# Patient Record
Sex: FEMALE | Race: WHITE | URBAN - METROPOLITAN AREA
[De-identification: names, ages, dates, MRNs, and addresses within clinical notes are randomized per-mention and may not be internally consistent; named-entity substitution may affect disease eponyms.]

---

## 2017-07-24 DIAGNOSIS — Z11.1 TUBERCULOSIS SCREENING: ICD-10-CM

## 2017-07-24 DIAGNOSIS — Z23 VACCINE FOR VIRAL HEPATITIS: ICD-10-CM

## 2017-07-24 DIAGNOSIS — Z23 NEED FOR TDAP VACCINATION: Primary | ICD-10-CM

## 2017-07-24 DIAGNOSIS — Z28.89 VARICELLA VACCINATION NOT INDICATED: ICD-10-CM

## 2017-07-24 PROCEDURE — 90472 IMMUNIZATION ADMIN EACH ADD: CPT

## 2017-07-24 PROCEDURE — 90715 TDAP VACCINE 7 YRS/> IM: CPT

## 2017-07-24 PROCEDURE — 90471 IMMUNIZATION ADMIN: CPT

## 2017-07-24 PROCEDURE — 86480 TB TEST CELL IMMUN MEASURE: CPT | Performed by: OBSTETRICS & GYNECOLOGY

## 2017-07-24 PROCEDURE — 86787 VARICELLA-ZOSTER ANTIBODY: CPT | Performed by: OBSTETRICS & GYNECOLOGY

## 2017-07-24 PROCEDURE — 36415 COLL VENOUS BLD VENIPUNCTURE: CPT | Performed by: OBSTETRICS & GYNECOLOGY

## 2017-07-24 PROCEDURE — 90636 HEP A/HEP B VACC ADULT IM: CPT

## 2017-07-24 NOTE — MR AVS SNAPSHOT
"              After Visit Summary   7/24/2017    Buffy Mayer    MRN: [id number]           Patient Information     Date Of Birth          1985        Visit Information        Provider Department      7/24/2017 10:00 AM WE LAB [facility]        Today's Diagnoses     Need for Tdap vaccination    -  1    Vaccine for viral hepatitis        Tuberculosis screening        Varicella vaccination not indicated           Follow-ups after your visit        Your next 10 appointments already scheduled     Aug 08, 2017  9:00 AM CDT   PHYSICAL with REJI Aburto CNP   [facility]    [address] [id number] [phone]              Who to contact     If you have questions or need follow up information about today's clinic visit or your schedule please contact [facility] directly at [phone].  Normal or non-critical lab and imaging results will be communicated to you by MyChart, letter or phone within 4 business days after the clinic has received the results. If you do not hear from us within 7 days, please contact the clinic through MyChart or phone. If you have a critical or abnormal lab result, we will notify you by phone as soon as possible.  Submit refill requests through [employer] or call your pharmacy and they will forward the refill request to us. Please allow 3 business days for your refill to be completed.          Additional Information About Your Visit        MyChart Information     [employer] lets you send messages to your doctor, view your test results, renew your prescriptions, schedule appointments and more. To sign up, go to [email or web address] . Click on \"Log in\" on the left side of the screen, which will take you to the Welcome page. Then click on \"Sign up Now\" on the right side of the page.     You will be asked to enter the access code listed below, as well as some " personal information. Please follow the directions to create your username and password.     Your access code is: [email or web address]  Expires: 10/22/2017 10:24 AM     Your access code will  in 90 days. If you need help or a new code, please call your [place] clinic or [phone].        Care EveryWhere ID     This is your Care EveryWhere ID. This could be used by other organizations to access your [place] medical records  [id number]         Blood Pressure from Last 3 Encounters:   07/08/15 102/64   12 114/72    Weight from Last 3 Encounters:   16 136 lb (61.7 kg)   07/08/15 139 lb (63 kg)   12 137 lb (62.1 kg)              We Performed the Following     HEPA/HEPB VACCINE ADULT IM     M Tuberculosis by Quantiferon     TDAP VACCINE (ADACEL)     Varicella Zoster Virus Antibody IgG        Primary Care Provider Office Phone # Fax #    Tami Scott PA-C [phone]       [facility] [address] [facility] [address]  [facility] [address]        Equal Access to Services     [facility]: Hadii aad ku hadasho Soomaali, waaxda luqadaha, qaybta kaalmada adeegyada, mateo bahena hayvicky eisenberg . So [facility] [phone].    ATENCIÓN: Si habla español, tiene a sotelo disposición servicios gratuitos de asistencia lingüística. Zoieame al [phone].    We comply with applicable federal civil rights laws and [place] laws. We do not discriminate on the basis of race, color, national origin, age, disability sex, sexual orientation or gender identity.            Thank you!     Thank you for choosing [facility] [place]  for your care. Our goal is always to provide you with excellent care. Hearing back from our patients is one way we can continue to improve our services. Please take a few minutes to complete the written survey that you may receive in the mail after your visit with us. Thank you!             Your Updated Medication List - Protect others around you: Learn how to safely  use, store and throw away your medicines at www.disposemymeds.org.          This list is accurate as of: 7/24/17 10:24 AM.  Always use your most recent med list.                   Brand Name Dispense Instructions for use Diagnosis    etonogestrel-ethinyl estradiol 0.12-0.015 MG/24HR vaginal ring    NUVARING    3 each    Insert 1 ring vaginally every 21 days then remove for 1 week then repeat with new ring.    Encounter for surveillance of other contraceptive       KARIVA 0.15-0.02/0.01 MG (21/5) per tablet   Generic drug:  desogestrel-ethinyl estradiol     28 tablet    TAKE ONE TABLET BY MOUTH ONE TIME DAILY    Uses oral contraception       penicillin V potassium 500 MG tablet    VEETID    20 tablet    Take 1 tablet by mouth 2 times daily.    Streptococcal pharyngitis

## 2017-07-25 LAB — VZV IGG SER QL IA: 4.9 AI (ref 0–0.8)

## 2017-07-26 LAB
M TB TUBERC IFN-G BLD QL: NEGATIVE
M TB TUBERC IFN-G/MITOGEN IGNF BLD: 0 IU/ML

## 2017-07-27 NOTE — NURSING NOTE
Screening Questionnaire for Adult Immunization    Are you sick today?   No   Do you have allergies to medications, food, a vaccine component or latex?   No   Have you ever had a serious reaction after receiving a vaccination?   No   Do you have a long-term health problem with heart disease, lung disease, asthma, kidney disease, metabolic disease (e.g. diabetes), anemia, or other blood disorder?   No   Do you have cancer, leukemia, HIV/AIDS, or any other immune system problem?   No   In the past 3 months, have you taken medications that affect  your immune system, such as prednisone, other steroids, or anticancer drugs; drugs for the treatment of rheumatoid arthritis, Crohn s disease, or psoriasis; or have you had radiation treatments?   No   Have you had a seizure, or a brain or other nervous system problem?   No   During the past year, have you received a transfusion of blood or blood     products, or been given immune (gamma) globulin or antiviral drug?   No   For women: Are you pregnant or is there a chance you could become        pregnant during the next month?   No   Have you received any vaccinations in the past 4 weeks?   No     Immunization questionnaire answers were all negative.      MNVFC doesn't apply on this patient    Per orders of Dr. Hernandez, injection of Tdap,TwinRix given by Kimberly Francois. Patient instructed to remain in clinic for 15 minutes afterwards, and to report any adverse reaction to me immediately.       Screening performed by Kimberly Francois on 7/24/2017 at 10:05 AM.

## 2017-08-08 ENCOUNTER — OFFICE VISIT (OUTPATIENT)
Dept: OBGYN | Facility: CLINIC | Age: 32
End: 2017-08-08
Payer: COMMERCIAL

## 2017-08-08 VITALS
DIASTOLIC BLOOD PRESSURE: 54 MMHG | WEIGHT: 156 LBS | HEIGHT: 65 IN | BODY MASS INDEX: 25.99 KG/M2 | SYSTOLIC BLOOD PRESSURE: 96 MMHG

## 2017-08-08 DIAGNOSIS — Z30.49 ENCOUNTER FOR SURVEILLANCE OF OTHER CONTRACEPTIVE: ICD-10-CM

## 2017-08-08 DIAGNOSIS — D25.9 UTERINE LEIOMYOMA, UNSPECIFIED LOCATION: ICD-10-CM

## 2017-08-08 DIAGNOSIS — Z01.419 ENCOUNTER FOR GYNECOLOGICAL EXAMINATION WITHOUT ABNORMAL FINDING: Primary | ICD-10-CM

## 2017-08-08 PROCEDURE — 99395 PREV VISIT EST AGE 18-39: CPT | Performed by: NURSE PRACTITIONER

## 2017-08-08 PROCEDURE — 87624 HPV HI-RISK TYP POOLED RSLT: CPT | Performed by: NURSE PRACTITIONER

## 2017-08-08 PROCEDURE — G0145 SCR C/V CYTO,THINLAYER,RESCR: HCPCS | Performed by: NURSE PRACTITIONER

## 2017-08-08 RX ORDER — ETONOGESTREL AND ETHINYL ESTRADIOL VAGINAL RING .015; .12 MG/D; MG/D
RING VAGINAL
Qty: 3 EACH | Refills: 3 | Status: SHIPPED | OUTPATIENT
Start: 2017-08-08 | End: 2017-08-24

## 2017-08-08 ASSESSMENT — ANXIETY QUESTIONNAIRES
GAD7 TOTAL SCORE: 1
7. FEELING AFRAID AS IF SOMETHING AWFUL MIGHT HAPPEN: NOT AT ALL
1. FEELING NERVOUS, ANXIOUS, OR ON EDGE: NOT AT ALL
5. BEING SO RESTLESS THAT IT IS HARD TO SIT STILL: NOT AT ALL
6. BECOMING EASILY ANNOYED OR IRRITABLE: NOT AT ALL
2. NOT BEING ABLE TO STOP OR CONTROL WORRYING: NOT AT ALL
3. WORRYING TOO MUCH ABOUT DIFFERENT THINGS: NOT AT ALL
IF YOU CHECKED OFF ANY PROBLEMS ON THIS QUESTIONNAIRE, HOW DIFFICULT HAVE THESE PROBLEMS MADE IT FOR YOU TO DO YOUR WORK, TAKE CARE OF THINGS AT HOME, OR GET ALONG WITH OTHER PEOPLE: NOT DIFFICULT AT ALL

## 2017-08-08 ASSESSMENT — PATIENT HEALTH QUESTIONNAIRE - PHQ9
5. POOR APPETITE OR OVEREATING: SEVERAL DAYS
SUM OF ALL RESPONSES TO PHQ QUESTIONS 1-9: 3

## 2017-08-08 NOTE — LETTER
August 18, 2017    Buffy Mayer  [address] [place] [address]    Dear Buffy,  We are happy to inform you that your PAP smear result from 8/8/17 is normal.  We are now able to do a follow up test on PAP smears. The DNA test is for HPV (Human Papilloma Virus). Cervical cancer is closely linked with certain types of HPV. Your result showed no evidence of high risk HPV.  Therefore we recommend you return in 1 year for your next pap smear.  You will still need to return to the clinic every year for an annual exam and other preventive tests.  Please contact the clinic at [phone] with any questions.  Sincerely,    REJI Aburto CNP/rlm

## 2017-08-08 NOTE — MR AVS SNAPSHOT
"              After Visit Summary   8/8/2017    Buffy Mayer    MRN: [id number]           Patient Information     Date Of Birth          1985        Visit Information        Provider Department      8/8/2017 9:00 AM Doris Mensah APRN CNP [facility]        Today's Diagnoses     Encounter for gynecological examination without abnormal finding    -  1    Encounter for surveillance of other contraceptive        Uterine leiomyoma, unspecified location           Follow-ups after your visit        Follow-up notes from your care team     Return in about 1 year (around 8/8/2018).      Future tests that were ordered for you today     Open Future Orders        Priority Expected Expires Ordered    US Transvaginal Non OB Routine  8/8/2018 8/8/2017            Who to contact     If you have questions or need follow up information about today's clinic visit or your schedule please contact [facility] directly at [phone].  Normal or non-critical lab and imaging results will be communicated to you by MyChart, letter or phone within 4 business days after the clinic has received the results. If you do not hear from us within 7 days, please contact the clinic through MyChart or phone. If you have a critical or abnormal lab result, we will notify you by phone as soon as possible.  Submit refill requests through [employer] or call your pharmacy and they will forward the refill request to us. Please allow 3 business days for your refill to be completed.          Additional Information About Your Visit        MyChart Information     [employer] lets you send messages to your doctor, view your test results, renew your prescriptions, schedule appointments and more. To sign up, go to [email or web address] . Click on \"Log in\" on the left side of the screen, which will take you to the Welcome page. Then click on \"Sign up Now\" on the right side of the page.     You will be asked to enter the " "access code listed below, as well as some personal information. Please follow the directions to create your username and password.     Your access code is: [email or web address]  Expires: 10/22/2017 10:24 AM     Your access code will  in 90 days. If you need help or a new code, please call your [place] clinic or [phone].        Care EveryWhere ID     This is your Care EveryWhere ID. This could be used by other organizations to access your [place] medical records  [id number]        Your Vitals Were     Height Last Period Breastfeeding? BMI (Body Mass Index)          5' 5.25\" (1.657 m) 2017 (Exact Date) No 25.76 kg/m2         Blood Pressure from Last 3 Encounters:   17 96/54   07/08/15 102/64   12 114/72    Weight from Last 3 Encounters:   17 156 lb (70.8 kg)   16 136 lb (61.7 kg)   07/08/15 139 lb (63 kg)              We Performed the Following     HPV High Risk Types DNA Cervical     Pap imaged thin layer screen with HPV - recommended age 30 - 65          Where to get your medicines      These medications were sent to [facility] [address] [facility] [place] - [address]  [address] [place] [address]     Phone:  [phone]     etonogestrel-ethinyl estradiol 0.12-0.015 MG/24HR vaginal ring          Primary Care Provider    None Specified       No primary provider on file.        Equal Access to Services     [facility]: Hadii lew garciao Jose Juan, waaxda lucrystaladaha, qaybta kaalmateo mckinney . So [facility] [phone].    ATENCIÓN: Si habla español, tiene a sotelo disposición servicios gratuitos de asistencia lingüística. Llame al [phone].    We comply with applicable federal civil rights laws and [place] laws. We do not discriminate on the basis of race, color, national origin, age, disability sex, sexual orientation or gender identity.            Thank you!     Thank you for choosing [facility] [place]  for your care. Our " goal is always to provide you with excellent care. Hearing back from our patients is one way we can continue to improve our services. Please take a few minutes to complete the written survey that you may receive in the mail after your visit with us. Thank you!             Your Updated Medication List - Protect others around you: Learn how to safely use, store and throw away your medicines at www.disposemymeds.org.          This list is accurate as of: 8/8/17  9:43 AM.  Always use your most recent med list.                   Brand Name Dispense Instructions for use Diagnosis    etonogestrel-ethinyl estradiol 0.12-0.015 MG/24HR vaginal ring    NUVARING    3 each    Insert 1 ring vaginally every 21 days then remove for 1 week then repeat with new ring.    Encounter for surveillance of other contraceptive

## 2017-08-08 NOTE — PROGRESS NOTES
Buffy is a 32 year old  female who presents for annual exam.     Besides routine health maintenance, she has no other health concerns today .    HPI:  The patient's PCP is None. Pt here today for her annual exam. She is feeling over all well. She has problems keeping her Nuva Ring in. Towards the end of the 2nd week she notices her ring 'slipping out'.    She is in school for sonography and her professor identified 3 uterine fibroids this fall.    She does want children in the future, she and her  are thinking early next year or so.      GYNECOLOGIC HISTORY:    Patient's last menstrual period was 2017 (exact date).  Her current contraception method is: Nuva Ring.  She  reports that she has never smoked. She has never used smokeless tobacco.      Patient is sexually active.  STD testing offered?  Declined  Last PHQ-9 score on record =   PHQ-9 SCORE 2017   Total Score 3     Last GAD7 score on record =   BELINDA-7 SCORE 2017   Total Score 1     Alcohol Score = 2    HEALTH MAINTENANCE:  Cholesterol: Never  Last Mammo: Never, Result: not applicable, Next Mammo: Age 40  Pap: Unsure  Colonoscopy:  Never, Result: not applicable, Next Colonoscopy: Age 50  Dexa: Never    Health maintenance updated:  yes    HISTORY:  Obstetric History       T0      L0     SAB0   TAB0   Ectopic0   Multiple0   Live Births0           There is no problem list on file for this patient.    Past Surgical History:   Procedure Laterality Date     COLONOSCOPY        Social History   Substance Use Topics     Smoking status: Never Smoker     Smokeless tobacco: Never Used     Alcohol use 0.0 oz/week     0 Standard drinks or equivalent per week      Problem (# of Occurrences) Relation (Name,Age of Onset)    Breast Cancer (1) Maternal Grandmother    Hyperlipidemia (2) Mother, Father    Hypertension (2) Mother, Father            Current Outpatient Prescriptions   Medication Sig     etonogestrel-ethinyl estradiol  "(NUVARING) 0.12-0.015 MG/24HR vaginal ring Insert 1 ring vaginally every 21 days then remove for 1 week then repeat with new ring.     [DISCONTINUED] etonogestrel-ethinyl estradiol (NUVARING) 0.12-0.015 MG/24HR vaginal ring Insert 1 ring vaginally every 21 days then remove for 1 week then repeat with new ring.     No current facility-administered medications for this visit.      No Known Allergies    Past medical, surgical, social and family histories were reviewed and updated in EPIC.    ROS:   12 point review of systems negative other than symptoms noted below.  Constitutional: Weight Gain  Head: Sore Throat  Genitourinary: Vaginal Itching  Musculoskeletal: Joint Pain    EXAM:  BP 96/54  Ht 5' 5.25\" (1.657 m)  Wt 156 lb (70.8 kg)  LMP 08/01/2017 (Exact Date)  Breastfeeding? No  BMI 25.76 kg/m2   BMI: Body mass index is 25.76 kg/(m^2).    PHYSICAL EXAM:  Constitutional:  Appearance: Well nourished, well developed, alert, in no acute distress  Neck:  Lymph Nodes:  No lymphadenopathy present    Thyroid:  Gland size normal, nontender, no nodules or masses present  on palpation  Chest:  Respiratory Effort:  Breathing unlabored  Cardiovascular:    Heart: Auscultation:  Regular rate, normal rhythm, no murmurs present  Breasts: Inspection of Breasts:  No lymphadenopathy present    Palpation of Breasts and Axillae:  No masses present on palpation, no  breast tenderness    Axillary Lymph Nodes:  No lymphadenopathy present  Gastrointestinal:   Abdominal Examination:  Abdomen nontender to palpation, tone normal without rigidity or guarding, no masses present, umbilicus without lesions   Liver and Spleen:  No hepatomegaly present, liver nontender to palpation    Hernias:  No hernias present  Lymphatic: Lymph Nodes:  No other lymphadenopathy present  Skin:  General Inspection:  No rashes present, no lesions present, no areas of  discoloration    Genitalia and Groin:  No rashes present, no lesions present, no areas of "  discoloration, no masses present  Neurologic/Psychiatric:    Mental Status:  Oriented X3     Pelvic Exam:  External Genitalia:     Normal appearance for age, no discharge present, no tenderness present, no inflammatory lesions present, color normal  Vagina:     Normal vaginal vault without central or paravaginal defects, no discharge present, no inflammatory lesions present, no masses present, NUVA RING PRESENT-REPOSITIONED FOR PROPER PLACEMENT  Bladder:     Nontender to palpation  Urethra:   Urethral Body:  Urethra palpation normal, urethra structural support normal   Urethral Meatus:  No erythema or lesions present  Cervix:     Appearance healthy, no lesions present, nontender to palpation, no bleeding present  Uterus:     Uterus: firm, normal sized and nontender, anteverted in position. ONE FIBROID NOTED cervically  Adnexa:     No adnexal tenderness present, no adnexal masses present  Perineum:     Perineum within normal limits, no evidence of trauma, no rashes or skin lesions present  Anus:     Anus within normal limits, no hemorrhoids present  Inguinal Lymph Nodes:     No lymphadenopathy present  Pubic Hair:     Normal pubic hair distribution for age  Genitalia and Groin:     No rashes present, no lesions present, no areas of discoloration, no masses present      COUNSELING:   Special attention given to:        Regular exercise       Healthy diet/nutrition       Contraception       Family planning    BMI: Body mass index is 25.76 kg/(m^2).  Weight management plan: Discussed healthy diet and exercise guidelines and patient will follow up in 12 months in clinic to re-evaluate.    ASSESSMENT:  32 year old female with satisfactory annual exam.    ICD-10-CM    1. Encounter for gynecological examination without abnormal finding Z01.419 Pap imaged thin layer screen with HPV - recommended age 30 - 65     HPV High Risk Types DNA Cervical   2. Encounter for surveillance of other contraceptive Z30.49 etonogestrel-ethinyl  estradiol (NUVARING) 0.12-0.015 MG/24HR vaginal ring   3. Uterine leiomyoma, unspecified location D25.9 US Transvaginal Non OB       PLAN:  Healthy, annual pap screening done. Ok to continue Nuva Ring use. Proper placement discussed with pt. Will do TVUS to map fibroids and see EB after for possible surgery consult as she does desire children in the future.     REJI Aburto CNP

## 2017-08-09 ASSESSMENT — ANXIETY QUESTIONNAIRES: GAD7 TOTAL SCORE: 1

## 2017-08-10 LAB
COPATH REPORT: NORMAL
PAP: NORMAL

## 2017-08-11 DIAGNOSIS — Z30.49 ENCOUNTER FOR SURVEILLANCE OF OTHER CONTRACEPTIVE: ICD-10-CM

## 2017-08-11 DIAGNOSIS — Z30.41 USES ORAL CONTRACEPTION: ICD-10-CM

## 2017-08-11 LAB
FINAL DIAGNOSIS: NORMAL
HPV HR 12 DNA CVX QL NAA+PROBE: NEGATIVE
HPV16 DNA SPEC QL NAA+PROBE: NEGATIVE
HPV18 DNA SPEC QL NAA+PROBE: NEGATIVE
SPECIMEN DESCRIPTION: NORMAL

## 2017-08-11 RX ORDER — ETONOGESTREL/ETHINYL ESTRADIOL .12-.015MG
RING, VAGINAL VAGINAL
Qty: 1 EACH | Refills: 0 | Status: SHIPPED | OUTPATIENT
Start: 2017-08-11 | End: 2017-08-24

## 2017-08-11 NOTE — TELEPHONE ENCOUNTER
Pending Prescriptions:                       Disp   Refills    NUVARING 0.12-0.015 MG/24HR vaginal ring [*       3        Sig: INSERT 1 RING VAGINALLY EVERY 21 DAYS THEN REMOVE FOR           1 WEEK THEN REPEAT WITH NEW RING.      Last Written Prescription Date: 8/8/17  Last Fill Quantity: 3,  # refills: 3   Last Office Visit with G, P or [facility] prescribing provider: 8/8/17  Future Office Visit: None

## 2017-08-24 NOTE — TELEPHONE ENCOUNTER
Pt wanting to come off of NuvaRing and go back on OCPs. Note routed to Doris Mensah- ok to reorder OCPs for pt?

## 2017-08-29 RX ORDER — DESOG-E.ESTRADIOL/E.ESTRADIOL 21-5 (28)
1 TABLET ORAL DAILY
Qty: 84 TABLET | Refills: 3 | Status: SHIPPED | OUTPATIENT
Start: 2017-08-29 | End: 2018-08-03

## 2018-05-21 ENCOUNTER — OFFICE VISIT (OUTPATIENT)
Dept: OBGYN | Facility: CLINIC | Age: 33
End: 2018-05-21
Attending: NURSE PRACTITIONER
Payer: COMMERCIAL

## 2018-05-21 ENCOUNTER — RADIANT APPOINTMENT (OUTPATIENT)
Dept: ULTRASOUND IMAGING | Facility: CLINIC | Age: 33
End: 2018-05-21
Attending: NURSE PRACTITIONER
Payer: COMMERCIAL

## 2018-05-21 VITALS
DIASTOLIC BLOOD PRESSURE: 60 MMHG | WEIGHT: 163 LBS | HEIGHT: 65 IN | HEART RATE: 68 BPM | SYSTOLIC BLOOD PRESSURE: 106 MMHG | BODY MASS INDEX: 27.16 KG/M2

## 2018-05-21 DIAGNOSIS — D21.9 MYOMA: Primary | ICD-10-CM

## 2018-05-21 DIAGNOSIS — D25.9 UTERINE LEIOMYOMA, UNSPECIFIED LOCATION: ICD-10-CM

## 2018-05-21 PROCEDURE — 99213 OFFICE O/P EST LOW 20 MIN: CPT | Performed by: OBSTETRICS & GYNECOLOGY

## 2018-05-21 PROCEDURE — 76830 TRANSVAGINAL US NON-OB: CPT | Performed by: OBSTETRICS & GYNECOLOGY

## 2018-05-21 NOTE — PROGRESS NOTES
"      SUBJECTIVE:                                                   Buffy Mayer is a 32 year old female who presents to clinic today for the following health issue(s):  Patient presents with:  Ultrasound: f/u to uterine leiomyoma      HPI:  The patient is seen today in follow-up of an ultrasound for multiple uterine fibroids.    Patient's last menstrual period was 2018 (approximate)..   Patient is sexually active, .  Using oral contraceptives for contraception.    reports that she has never smoked. She has never used smokeless tobacco.    STD testing offered?  Declined    Health maintenance updated:  yes    Today's PHQ-9 Score:   PHQ-9 SCORE 2017   Total Score 3     Today's BELINDA-7 Score:   BELINDA-7 SCORE 2017   Total Score 1       Problem list and histories reviewed & adjusted, as indicated.  Additional history: as documented.    There is no problem list on file for this patient.    Past Surgical History:   Procedure Laterality Date     COLONOSCOPY        Social History   Substance Use Topics     Smoking status: Never Smoker     Smokeless tobacco: Never Used     Alcohol use 0.0 oz/week     0 Standard drinks or equivalent per week      Problem (# of Occurrences) Relation (Name,Age of Onset)    Breast Cancer (1) Maternal Grandmother    Hyperlipidemia (2) Mother, Father    Hypertension (2) Mother, Father            Current Outpatient Prescriptions   Medication Sig     KARIVA 0.15-0.02/0.01 MG () per tablet Take 1 tablet by mouth daily     No current facility-administered medications for this visit.      No Known Allergies    ROS:  12 point review of systems negative other than symptoms noted below.  Constitutional: Weight Gain  Musculoskeletal: Joint Pain    OBJECTIVE:     /60  Pulse 68  Ht 5' 5.25\" (1.657 m)  Wt 163 lb (73.9 kg)  LMP 2018 (Approximate)  BMI 26.92 kg/m2  Body mass index is 26.92 kg/(m^2).    Exam:  Constitutional:  Appearance: Well nourished, well developed " alert, in no acute distress chest is clear to percussion auscultation all fields.  Cardiovascular exam within normal limits with normal S1-S2 no murmur.  Abdominal examination shows no previous scars or mass-effect.  Incision sites are reviewed and accepted.  Pelvic exam is deferred to her ultrasound today    In-Clinic Test Results:  Results for orders placed or performed in visit on 05/21/18   US Transvaginal Non OB    Narrative    US Transvaginal Non OB    Order #: [id number] Accession #: [id number]         Study Notes        Vianey Conner on 5/21/2018 12:58 PM     Gynecological Ultrasound Report  Pelvic U/S - Transvaginal    [facility]  Referring Provider: Dr. Fran Ayala  Sonographer: Vianey Conner [facility]  Indication: Fibroids  LMP : 04/03/18  History:   Gynecological Ultrasonography:   Uterus: anteverted  Size: 7.06 x 5.09 x 3.62cm.    Findings: Right subserosal fibroid= 4x 3.9x 3.1cm,  Posterior subserosal   fibroid= 1.7x 1.6x 1.2cm,  Posterior subserosal fibroid= 2.3x 2.4x 1.8cm,    Posterior subserosal fibroid= 1.5x 1.1x .8cm, fundal pedunculated fibroid=   2.3x 1.9x 2cm  Endometrium: Thickness total 2.78mm  Findings: Normal  Right Ovary: 2.33 x 1.05 x .85cm.    Left Ovary: 3.15 x 1.74 x 2.42cm.   Cul de Sac/Pouch of Kleber: No FF      Impression: Multiple myomas multiple myomas throughout the uterus  ___________________________________________________________________________  _______               Discussed in office         ASSESSMENT/PLAN:                                                    32-year-old patient with multiple subserosal and pedunculated fibroids.  She is thinking about pregnancy sometime soon.  We reviewed the options of laparoscopic myomectomy versus observation.  The patient will decide on timing and disposition.  The risks and complications of the surgery were reviewed at length.            Fran Ayala MD  [facility] [place]

## 2018-05-21 NOTE — MR AVS SNAPSHOT
"              After Visit Summary   2018    Buffy Mayer    MRN: [id number]           Patient Information     Date Of Birth          1985        Visit Information        Provider Department      2018 1:00 PM Fran Ayala MD [facility]        Today's Diagnoses     Myoma    -  1       Follow-ups after your visit        Who to contact     If you have questions or need follow up information about today's clinic visit or your schedule please contact [facility] directly at [phone].  Normal or non-critical lab and imaging results will be communicated to you by [employer], letter or phone within 4 business days after the clinic has received the results. If you do not hear from us within 7 days, please contact the clinic through [employer] or phone. If you have a critical or abnormal lab result, we will notify you by phone as soon as possible.  Submit refill requests through [employer] or call your pharmacy and they will forward the refill request to us. Please allow 3 business days for your refill to be completed.          Additional Information About Your Visit        MyChart Information     [employer] lets you send messages to your doctor, view your test results, renew your prescriptions, schedule appointments and more. To sign up, go to [email or web address] . Click on \"Log in\" on the left side of the screen, which will take you to the Welcome page. Then click on \"Sign up Now\" on the right side of the page.     You will be asked to enter the access code listed below, as well as some personal information. Please follow the directions to create your username and password.     Your access code is: [email or web address]  Expires: 2018  1:16 PM     Your access code will  in 90 days. If you need help or a new code, please call your [facility] or [phone].        Care EveryWhere ID     This is your Care EveryWhere ID. This could be used by other organizations to " "access your [place] medical records  [id number]        Your Vitals Were     Pulse Height Last Period BMI (Body Mass Index)          68 5' 5.25\" (1.657 m) 04/03/2018 (Approximate) 26.92 kg/m2         Blood Pressure from Last 3 Encounters:   05/21/18 106/60   08/08/17 96/54   07/08/15 102/64    Weight from Last 3 Encounters:   05/21/18 163 lb (73.9 kg)   08/08/17 156 lb (70.8 kg)   08/12/16 136 lb (61.7 kg)              Today, you had the following     No orders found for display       Primary Care Provider Office Phone # Fax #    [facility] [phone]       [place] [address] [address] [facility] [address]  [facility] [address]        Equal Access to Services     ANDRESSA ESPINO : Hadii lew garciao Soeve, waaxda luqadaha, qaybta kaalmada adewandayayoli, mateo eisenberg . So [facility] [phone].    ATENCIÓN: Si habla español, tiene a sotelo disposición servicios gratuitos de asistencia lingüística. Llame al [phone].    We comply with applicable federal civil rights laws and [place] laws. We do not discriminate on the basis of race, color, national origin, age, disability, sex, sexual orientation, or gender identity.            Thank you!     Thank you for choosing [facility]  for your care. Our goal is always to provide you with excellent care. Hearing back from our patients is one way we can continue to improve our services. Please take a few minutes to complete the written survey that you may receive in the mail after your visit with us. Thank you!             Your Updated Medication List - Protect others around you: Learn how to safely use, store and throw away your medicines at www.disposemymeds.org.          This list is accurate as of 5/21/18  1:16 PM.  Always use your most recent med list.                   Brand Name Dispense Instructions for use Diagnosis    KARIVA 0.15-0.02/0.01 MG (21/5) per tablet "   Generic drug:  desogestrel-ethinyl estradiol     84 tablet    Take 1 tablet by mouth daily    Uses oral contraception

## 2018-05-22 ENCOUNTER — TELEPHONE (OUTPATIENT)
Dept: OBGYN | Facility: CLINIC | Age: 33
End: 2018-05-22

## 2018-05-22 DIAGNOSIS — D21.9 MYOMA: Primary | ICD-10-CM

## 2018-05-22 NOTE — TELEPHONE ENCOUNTER
Type of surgery: LSC MYOMECTOMY POSS LAP  Location of surgery: [facility] OR  Date and time of surgery: 6/5/2018 9:30am ARRIVAL 8am  Surgeon: BLADIMIR Ayala  Pre-Op Appt Date: 5/21/2018  Post-Op Appt Date: TBD   Packet sent out: HANDED 5/21/2018  Pre-cert/Authorization completed:  TBD  Date: 5/22/2018 Edwige cooney/Liv Nettles  Surgery Scheduler        Order Questions      Question Answer Comment     Procedure name(s) - multi select Laparoscopy, myomectomy, possible laparotomy      Reason for procedure Myomas      Is this a multi surgeon case? No      Laterality N/A      Request for additional equipment Other (see comments) None     Anesthesia General      Initiate Pre-op orders for above procedure: Yes, as ordered in Epic Additional orders noted there also     Location of Case: [facility] OR      Surgeon Procedure Time (incision to closure) in minutes (per procedure as applicable) 75      Note:  Surgical Case Time Needed (in minutes)     Patient Class (for admit prior to surgery, specify number of days in comments): Same day (hospital outpatient)      Why can t this outpatient surgery be done at the [facility] or [facility]? -      Vendor Needed? No

## 2018-05-31 NOTE — H&P (VIEW-ONLY)
"      SUBJECTIVE:                                                   Buffy Mayer is a 32 year old female who presents to clinic today for the following health issue(s):  Patient presents with:  Ultrasound: f/u to uterine leiomyoma      HPI:  The patient is seen today in follow-up of an ultrasound for multiple uterine fibroids.    Patient's last menstrual period was 2018 (approximate)..   Patient is sexually active, .  Using oral contraceptives for contraception.    reports that she has never smoked. She has never used smokeless tobacco.    STD testing offered?  Declined    Health maintenance updated:  yes    Today's PHQ-9 Score:   PHQ-9 SCORE 2017   Total Score 3     Today's BELINDA-7 Score:   BELINDA-7 SCORE 2017   Total Score 1       Problem list and histories reviewed & adjusted, as indicated.  Additional history: as documented.    There is no problem list on file for this patient.    Past Surgical History:   Procedure Laterality Date     COLONOSCOPY        Social History   Substance Use Topics     Smoking status: Never Smoker     Smokeless tobacco: Never Used     Alcohol use 0.0 oz/week     0 Standard drinks or equivalent per week      Problem (# of Occurrences) Relation (Name,Age of Onset)    Breast Cancer (1) Maternal Grandmother    Hyperlipidemia (2) Mother, Father    Hypertension (2) Mother, Father            Current Outpatient Prescriptions   Medication Sig     KARIVA 0.15-0.02/0.01 MG () per tablet Take 1 tablet by mouth daily     No current facility-administered medications for this visit.      No Known Allergies    ROS:  12 point review of systems negative other than symptoms noted below.  Constitutional: Weight Gain  Musculoskeletal: Joint Pain    OBJECTIVE:     /60  Pulse 68  Ht 5' 5.25\" (1.657 m)  Wt 163 lb (73.9 kg)  LMP 2018 (Approximate)  BMI 26.92 kg/m2  Body mass index is 26.92 kg/(m^2).    Exam:  Constitutional:  Appearance: Well nourished, well developed " alert, in no acute distress chest is clear to percussion auscultation all fields.  Cardiovascular exam within normal limits with normal S1-S2 no murmur.  Abdominal examination shows no previous scars or mass-effect.  Incision sites are reviewed and accepted.  Pelvic exam is deferred to her ultrasound today    In-Clinic Test Results:  Results for orders placed or performed in visit on 05/21/18   US Transvaginal Non OB    Narrative    US Transvaginal Non OB    Order #: [id number] Accession #: [id number]         Study Notes        Vianey Conner on 5/21/2018 12:58 PM     Gynecological Ultrasound Report  Pelvic U/S - Transvaginal    [facility]  Referring Provider: Dr. Fran Ayala  Sonographer: Vianey Conner [facility]  Indication: Fibroids  LMP : 04/03/18  History:   Gynecological Ultrasonography:   Uterus: anteverted  Size: 7.06 x 5.09 x 3.62cm.    Findings: Right subserosal fibroid= 4x 3.9x 3.1cm,  Posterior subserosal   fibroid= 1.7x 1.6x 1.2cm,  Posterior subserosal fibroid= 2.3x 2.4x 1.8cm,    Posterior subserosal fibroid= 1.5x 1.1x .8cm, fundal pedunculated fibroid=   2.3x 1.9x 2cm  Endometrium: Thickness total 2.78mm  Findings: Normal  Right Ovary: 2.33 x 1.05 x .85cm.    Left Ovary: 3.15 x 1.74 x 2.42cm.   Cul de Sac/Pouch of Kleber: No FF      Impression: Multiple myomas multiple myomas throughout the uterus  ___________________________________________________________________________  _______               Discussed in office         ASSESSMENT/PLAN:                                                    32-year-old patient with multiple subserosal and pedunculated fibroids.  She is thinking about pregnancy sometime soon.  We reviewed the options of laparoscopic myomectomy versus observation.  The patient will decide on timing and disposition.  The risks and complications of the surgery were reviewed at length.            Fran Ayala MD  [facility] FOR [facility]

## 2018-06-04 RX ORDER — PHENAZOPYRIDINE HYDROCHLORIDE 200 MG/1
200 TABLET, FILM COATED ORAL ONCE
Status: CANCELLED | OUTPATIENT
Start: 2018-06-04 | End: 2018-06-04

## 2018-06-04 NOTE — TELEPHONE ENCOUNTER
Pt calling inquiring about possible cost of her surgery tomorrow. Referred her to [facility] [phone]  Verbalized understanding and no further questions.  Doris GARRISON RN

## 2018-06-05 ENCOUNTER — ANESTHESIA EVENT (OUTPATIENT)
Dept: SURGERY | Facility: CLINIC | Age: 33
End: 2018-06-05
Payer: COMMERCIAL

## 2018-06-05 ENCOUNTER — SURGERY (OUTPATIENT)
Age: 33
End: 2018-06-05
Payer: COMMERCIAL

## 2018-06-05 ENCOUNTER — ANESTHESIA (OUTPATIENT)
Dept: SURGERY | Facility: CLINIC | Age: 33
End: 2018-06-05
Payer: COMMERCIAL

## 2018-06-05 ENCOUNTER — HOSPITAL ENCOUNTER (OUTPATIENT)
Facility: CLINIC | Age: 33
Discharge: HOME | End: 2018-06-05
Attending: OBSTETRICS & GYNECOLOGY | Admitting: OBSTETRICS & GYNECOLOGY
Payer: COMMERCIAL

## 2018-06-05 VITALS
OXYGEN SATURATION: 97 % | SYSTOLIC BLOOD PRESSURE: 115 MMHG | BODY MASS INDEX: 26.49 KG/M2 | RESPIRATION RATE: 18 BRPM | WEIGHT: 159 LBS | HEIGHT: 65 IN | DIASTOLIC BLOOD PRESSURE: 65 MMHG | TEMPERATURE: 98.4 F

## 2018-06-05 DIAGNOSIS — D21.9 MYOMA: Primary | ICD-10-CM

## 2018-06-05 LAB — B-HCG SERPL-ACNC: <1 IU/L (ref 0–5)

## 2018-06-05 PROCEDURE — 37000009 ZZH ANESTHESIA TECHNICAL FEE, EACH ADDTL 15 MIN: Performed by: OBSTETRICS & GYNECOLOGY

## 2018-06-05 PROCEDURE — C1765 ADHESION BARRIER: HCPCS | Performed by: OBSTETRICS & GYNECOLOGY

## 2018-06-05 PROCEDURE — 25000128 H RX IP 250 OP 636: Performed by: ANESTHESIOLOGY

## 2018-06-05 PROCEDURE — 40000170 ZZH STATISTIC PRE-PROCEDURE ASSESSMENT II: Performed by: OBSTETRICS & GYNECOLOGY

## 2018-06-05 PROCEDURE — 36000063 ZZH SURGERY LEVEL 4 EA 15 ADDTL MIN: Performed by: OBSTETRICS & GYNECOLOGY

## 2018-06-05 PROCEDURE — 36415 COLL VENOUS BLD VENIPUNCTURE: CPT | Performed by: OBSTETRICS & GYNECOLOGY

## 2018-06-05 PROCEDURE — 37000008 ZZH ANESTHESIA TECHNICAL FEE, 1ST 30 MIN: Performed by: OBSTETRICS & GYNECOLOGY

## 2018-06-05 PROCEDURE — 27210794 ZZH OR GENERAL SUPPLY STERILE: Performed by: OBSTETRICS & GYNECOLOGY

## 2018-06-05 PROCEDURE — 40000935 ZZH STATISTIC OUTPATIENT (NON-OBS) EVE

## 2018-06-05 PROCEDURE — 71000012 ZZH RECOVERY PHASE 1 LEVEL 1 FIRST HR: Performed by: OBSTETRICS & GYNECOLOGY

## 2018-06-05 PROCEDURE — 71000013 ZZH RECOVERY PHASE 1 LEVEL 1 EA ADDTL HR: Performed by: OBSTETRICS & GYNECOLOGY

## 2018-06-05 PROCEDURE — 88305 TISSUE EXAM BY PATHOLOGIST: CPT | Performed by: OBSTETRICS & GYNECOLOGY

## 2018-06-05 PROCEDURE — 25000566 ZZH SEVOFLURANE, EA 15 MIN: Performed by: OBSTETRICS & GYNECOLOGY

## 2018-06-05 PROCEDURE — 36000093 ZZH SURGERY LEVEL 4 1ST 30 MIN: Performed by: OBSTETRICS & GYNECOLOGY

## 2018-06-05 PROCEDURE — 88305 TISSUE EXAM BY PATHOLOGIST: CPT | Mod: 26 | Performed by: OBSTETRICS & GYNECOLOGY

## 2018-06-05 PROCEDURE — 58546 LAPARO-MYOMECTOMY COMPLEX: CPT | Performed by: OBSTETRICS & GYNECOLOGY

## 2018-06-05 PROCEDURE — 25000125 ZZHC RX 250: Performed by: OBSTETRICS & GYNECOLOGY

## 2018-06-05 PROCEDURE — 25800025 ZZH RX 258: Performed by: OBSTETRICS & GYNECOLOGY

## 2018-06-05 PROCEDURE — 27210995 ZZH RX 272: Performed by: OBSTETRICS & GYNECOLOGY

## 2018-06-05 PROCEDURE — 25000128 H RX IP 250 OP 636: Performed by: OBSTETRICS & GYNECOLOGY

## 2018-06-05 PROCEDURE — 25000128 H RX IP 250 OP 636: Performed by: NURSE ANESTHETIST, CERTIFIED REGISTERED

## 2018-06-05 PROCEDURE — 84702 CHORIONIC GONADOTROPIN TEST: CPT | Performed by: OBSTETRICS & GYNECOLOGY

## 2018-06-05 PROCEDURE — 25000125 ZZHC RX 250: Performed by: NURSE ANESTHETIST, CERTIFIED REGISTERED

## 2018-06-05 RX ORDER — CEFAZOLIN SODIUM 1 G/3ML
1 INJECTION, POWDER, FOR SOLUTION INTRAMUSCULAR; INTRAVENOUS SEE ADMIN INSTRUCTIONS
Status: DISCONTINUED | OUTPATIENT
Start: 2018-06-05 | End: 2018-06-05 | Stop reason: HOSPADM

## 2018-06-05 RX ORDER — SODIUM CHLORIDE, SODIUM LACTATE, POTASSIUM CHLORIDE, AND CALCIUM CHLORIDE .6; .31; .03; .02 G/100ML; G/100ML; G/100ML; G/100ML
IRRIGANT IRRIGATION PRN
Status: DISCONTINUED | OUTPATIENT
Start: 2018-06-05 | End: 2018-06-05 | Stop reason: HOSPADM

## 2018-06-05 RX ORDER — PROPOFOL 10 MG/ML
INJECTION, EMULSION INTRAVENOUS CONTINUOUS PRN
Status: DISCONTINUED | OUTPATIENT
Start: 2018-06-05 | End: 2018-06-05

## 2018-06-05 RX ORDER — HYDROXYZINE HYDROCHLORIDE 50 MG/ML
50 INJECTION, SOLUTION INTRAMUSCULAR
Status: DISCONTINUED | OUTPATIENT
Start: 2018-06-05 | End: 2018-06-05 | Stop reason: HOSPADM

## 2018-06-05 RX ORDER — HYDROMORPHONE HYDROCHLORIDE 1 MG/ML
0.2 INJECTION, SOLUTION INTRAMUSCULAR; INTRAVENOUS; SUBCUTANEOUS
Status: DISCONTINUED | OUTPATIENT
Start: 2018-06-05 | End: 2018-06-05 | Stop reason: HOSPADM

## 2018-06-05 RX ORDER — SODIUM CHLORIDE, SODIUM LACTATE, POTASSIUM CHLORIDE, CALCIUM CHLORIDE 600; 310; 30; 20 MG/100ML; MG/100ML; MG/100ML; MG/100ML
INJECTION, SOLUTION INTRAVENOUS CONTINUOUS PRN
Status: DISCONTINUED | OUTPATIENT
Start: 2018-06-05 | End: 2018-06-05

## 2018-06-05 RX ORDER — LIDOCAINE 40 MG/G
CREAM TOPICAL
Status: DISCONTINUED | OUTPATIENT
Start: 2018-06-05 | End: 2018-06-05 | Stop reason: HOSPADM

## 2018-06-05 RX ORDER — NEOSTIGMINE METHYLSULFATE 1 MG/ML
VIAL (ML) INJECTION PRN
Status: DISCONTINUED | OUTPATIENT
Start: 2018-06-05 | End: 2018-06-05

## 2018-06-05 RX ORDER — ONDANSETRON 4 MG/1
4 TABLET, ORALLY DISINTEGRATING ORAL EVERY 30 MIN PRN
Status: DISCONTINUED | OUTPATIENT
Start: 2018-06-05 | End: 2018-06-05 | Stop reason: HOSPADM

## 2018-06-05 RX ORDER — FENTANYL CITRATE 50 UG/ML
25-50 INJECTION, SOLUTION INTRAMUSCULAR; INTRAVENOUS EVERY 5 MIN PRN
Status: DISCONTINUED | OUTPATIENT
Start: 2018-06-05 | End: 2018-06-05 | Stop reason: HOSPADM

## 2018-06-05 RX ORDER — ONDANSETRON 2 MG/ML
4 INJECTION INTRAMUSCULAR; INTRAVENOUS EVERY 6 HOURS PRN
Status: DISCONTINUED | OUTPATIENT
Start: 2018-06-05 | End: 2018-06-05 | Stop reason: HOSPADM

## 2018-06-05 RX ORDER — NALOXONE HYDROCHLORIDE 0.4 MG/ML
.1-.4 INJECTION, SOLUTION INTRAMUSCULAR; INTRAVENOUS; SUBCUTANEOUS
Status: DISCONTINUED | OUTPATIENT
Start: 2018-06-05 | End: 2018-06-05 | Stop reason: HOSPADM

## 2018-06-05 RX ORDER — BUPIVACAINE HYDROCHLORIDE 2.5 MG/ML
INJECTION, SOLUTION INFILTRATION; PERINEURAL PRN
Status: DISCONTINUED | OUTPATIENT
Start: 2018-06-05 | End: 2018-06-05 | Stop reason: HOSPADM

## 2018-06-05 RX ORDER — ONDANSETRON 2 MG/ML
INJECTION INTRAMUSCULAR; INTRAVENOUS PRN
Status: DISCONTINUED | OUTPATIENT
Start: 2018-06-05 | End: 2018-06-05

## 2018-06-05 RX ORDER — PROCHLORPERAZINE MALEATE 10 MG
10 TABLET ORAL EVERY 6 HOURS PRN
Status: DISCONTINUED | OUTPATIENT
Start: 2018-06-05 | End: 2018-06-05 | Stop reason: HOSPADM

## 2018-06-05 RX ORDER — OXYCODONE AND ACETAMINOPHEN 5; 325 MG/1; MG/1
1-2 TABLET ORAL EVERY 4 HOURS PRN
Qty: 30 TABLET | Refills: 0 | Status: SHIPPED | OUTPATIENT
Start: 2018-06-05 | End: 2018-08-29

## 2018-06-05 RX ORDER — HYDROMORPHONE HYDROCHLORIDE 1 MG/ML
.3-.5 INJECTION, SOLUTION INTRAMUSCULAR; INTRAVENOUS; SUBCUTANEOUS EVERY 10 MIN PRN
Status: DISCONTINUED | OUTPATIENT
Start: 2018-06-05 | End: 2018-06-05 | Stop reason: HOSPADM

## 2018-06-05 RX ORDER — ONDANSETRON 2 MG/ML
4 INJECTION INTRAMUSCULAR; INTRAVENOUS EVERY 30 MIN PRN
Status: DISCONTINUED | OUTPATIENT
Start: 2018-06-05 | End: 2018-06-05 | Stop reason: HOSPADM

## 2018-06-05 RX ORDER — PROPOFOL 10 MG/ML
INJECTION, EMULSION INTRAVENOUS PRN
Status: DISCONTINUED | OUTPATIENT
Start: 2018-06-05 | End: 2018-06-05

## 2018-06-05 RX ORDER — DEXAMETHASONE SODIUM PHOSPHATE 4 MG/ML
INJECTION, SOLUTION INTRA-ARTICULAR; INTRALESIONAL; INTRAMUSCULAR; INTRAVENOUS; SOFT TISSUE PRN
Status: DISCONTINUED | OUTPATIENT
Start: 2018-06-05 | End: 2018-06-05

## 2018-06-05 RX ORDER — GLYCOPYRROLATE 0.2 MG/ML
INJECTION, SOLUTION INTRAMUSCULAR; INTRAVENOUS PRN
Status: DISCONTINUED | OUTPATIENT
Start: 2018-06-05 | End: 2018-06-05

## 2018-06-05 RX ORDER — KETOROLAC TROMETHAMINE 30 MG/ML
30 INJECTION, SOLUTION INTRAMUSCULAR; INTRAVENOUS ONCE
Status: DISCONTINUED | OUTPATIENT
Start: 2018-06-05 | End: 2018-06-05 | Stop reason: HOSPADM

## 2018-06-05 RX ORDER — ONDANSETRON 4 MG/1
4 TABLET, ORALLY DISINTEGRATING ORAL EVERY 6 HOURS PRN
Status: DISCONTINUED | OUTPATIENT
Start: 2018-06-05 | End: 2018-06-05 | Stop reason: HOSPADM

## 2018-06-05 RX ORDER — MEPERIDINE HYDROCHLORIDE 25 MG/ML
12.5 INJECTION INTRAMUSCULAR; INTRAVENOUS; SUBCUTANEOUS
Status: DISCONTINUED | OUTPATIENT
Start: 2018-06-05 | End: 2018-06-05 | Stop reason: HOSPADM

## 2018-06-05 RX ORDER — FENTANYL CITRATE 50 UG/ML
INJECTION, SOLUTION INTRAMUSCULAR; INTRAVENOUS PRN
Status: DISCONTINUED | OUTPATIENT
Start: 2018-06-05 | End: 2018-06-05

## 2018-06-05 RX ORDER — FENTANYL CITRATE 50 UG/ML
25-50 INJECTION, SOLUTION INTRAMUSCULAR; INTRAVENOUS
Status: DISCONTINUED | OUTPATIENT
Start: 2018-06-05 | End: 2018-06-05 | Stop reason: HOSPADM

## 2018-06-05 RX ORDER — BUPIVACAINE HYDROCHLORIDE 2.5 MG/ML
INJECTION, SOLUTION EPIDURAL; INFILTRATION; INTRACAUDAL
Status: DISCONTINUED
Start: 2018-06-05 | End: 2018-06-05 | Stop reason: HOSPADM

## 2018-06-05 RX ORDER — LIDOCAINE HYDROCHLORIDE 20 MG/ML
INJECTION, SOLUTION INFILTRATION; PERINEURAL PRN
Status: DISCONTINUED | OUTPATIENT
Start: 2018-06-05 | End: 2018-06-05

## 2018-06-05 RX ORDER — CEFAZOLIN SODIUM 2 G/100ML
2 INJECTION, SOLUTION INTRAVENOUS
Status: COMPLETED | OUTPATIENT
Start: 2018-06-05 | End: 2018-06-05

## 2018-06-05 RX ORDER — PHYSOSTIGMINE SALICYLATE 1 MG/ML
1.2 INJECTION INTRAVENOUS
Status: DISCONTINUED | OUTPATIENT
Start: 2018-06-05 | End: 2018-06-05 | Stop reason: HOSPADM

## 2018-06-05 RX ORDER — SODIUM CHLORIDE, SODIUM LACTATE, POTASSIUM CHLORIDE, CALCIUM CHLORIDE 600; 310; 30; 20 MG/100ML; MG/100ML; MG/100ML; MG/100ML
INJECTION, SOLUTION INTRAVENOUS CONTINUOUS
Status: DISCONTINUED | OUTPATIENT
Start: 2018-06-05 | End: 2018-06-05 | Stop reason: HOSPADM

## 2018-06-05 RX ADMIN — SODIUM CHLORIDE, SODIUM LACTATE, POTASSIUM CHLORIDE, CALCIUM CHLORIDE 2000 ML: 600; 310; 30; 20 IRRIGANT IRRIGATION at 11:08

## 2018-06-05 RX ADMIN — DEXMEDETOMIDINE HYDROCHLORIDE 4 MCG: 100 INJECTION, SOLUTION INTRAVENOUS at 10:25

## 2018-06-05 RX ADMIN — ROCURONIUM BROMIDE 25 MG: 10 INJECTION INTRAVENOUS at 09:58

## 2018-06-05 RX ADMIN — DEXMEDETOMIDINE HYDROCHLORIDE 8 MCG: 100 INJECTION, SOLUTION INTRAVENOUS at 10:17

## 2018-06-05 RX ADMIN — ONDANSETRON 4 MG: 2 INJECTION INTRAMUSCULAR; INTRAVENOUS at 11:24

## 2018-06-05 RX ADMIN — PROPOFOL 200 MG: 10 INJECTION, EMULSION INTRAVENOUS at 09:58

## 2018-06-05 RX ADMIN — BUPIVACAINE HYDROCHLORIDE 28 ML: 2.5 INJECTION, SOLUTION EPIDURAL; INFILTRATION; INTRACAUDAL; PERINEURAL at 11:37

## 2018-06-05 RX ADMIN — SODIUM CHLORIDE, POTASSIUM CHLORIDE, SODIUM LACTATE AND CALCIUM CHLORIDE: 600; 310; 30; 20 INJECTION, SOLUTION INTRAVENOUS at 09:52

## 2018-06-05 RX ADMIN — GLYCOPYRROLATE 0.5 MG: 0.2 INJECTION, SOLUTION INTRAMUSCULAR; INTRAVENOUS at 11:30

## 2018-06-05 RX ADMIN — HYDROMORPHONE HYDROCHLORIDE 0.2 MG: 1 INJECTION, SOLUTION INTRAMUSCULAR; INTRAVENOUS; SUBCUTANEOUS at 16:44

## 2018-06-05 RX ADMIN — LIDOCAINE HYDROCHLORIDE 60 MG: 20 INJECTION, SOLUTION INFILTRATION; PERINEURAL at 09:58

## 2018-06-05 RX ADMIN — FENTANYL CITRATE 50 MCG: 50 INJECTION, SOLUTION INTRAMUSCULAR; INTRAVENOUS at 10:13

## 2018-06-05 RX ADMIN — HYDROMORPHONE HYDROCHLORIDE 0.2 MG: 1 INJECTION, SOLUTION INTRAMUSCULAR; INTRAVENOUS; SUBCUTANEOUS at 19:06

## 2018-06-05 RX ADMIN — DEXMEDETOMIDINE HYDROCHLORIDE 8 MCG: 100 INJECTION, SOLUTION INTRAVENOUS at 09:56

## 2018-06-05 RX ADMIN — FENTANYL CITRATE 50 MCG: 50 INJECTION, SOLUTION INTRAMUSCULAR; INTRAVENOUS at 13:11

## 2018-06-05 RX ADMIN — FENTANYL CITRATE 50 MCG: 50 INJECTION, SOLUTION INTRAMUSCULAR; INTRAVENOUS at 12:44

## 2018-06-05 RX ADMIN — PROPOFOL 200 MCG/KG/MIN: 10 INJECTION, EMULSION INTRAVENOUS at 09:58

## 2018-06-05 RX ADMIN — FENTANYL CITRATE 50 MCG: 50 INJECTION, SOLUTION INTRAMUSCULAR; INTRAVENOUS at 09:58

## 2018-06-05 RX ADMIN — VASOPRESSIN 20 ML: 20 INJECTION INTRAVENOUS at 11:17

## 2018-06-05 RX ADMIN — NEOSTIGMINE METHYLSULFATE 3 MG: 1 INJECTION, SOLUTION INTRAVENOUS at 11:30

## 2018-06-05 RX ADMIN — ROCURONIUM BROMIDE 10 MG: 10 INJECTION INTRAVENOUS at 10:11

## 2018-06-05 RX ADMIN — CEFAZOLIN SODIUM 2 G: 2 INJECTION, SOLUTION INTRAVENOUS at 10:11

## 2018-06-05 RX ADMIN — HYDROMORPHONE HYDROCHLORIDE 0.5 MG: 1 INJECTION, SOLUTION INTRAMUSCULAR; INTRAVENOUS; SUBCUTANEOUS at 13:52

## 2018-06-05 RX ADMIN — FENTANYL CITRATE 50 MCG: 50 INJECTION, SOLUTION INTRAMUSCULAR; INTRAVENOUS at 12:23

## 2018-06-05 RX ADMIN — DEXTROSE AND SODIUM CHLORIDE: 5; 450 INJECTION, SOLUTION INTRAVENOUS at 16:23

## 2018-06-05 RX ADMIN — MIDAZOLAM 2 MG: 1 INJECTION INTRAMUSCULAR; INTRAVENOUS at 09:55

## 2018-06-05 RX ADMIN — SODIUM CHLORIDE, POTASSIUM CHLORIDE, SODIUM LACTATE AND CALCIUM CHLORIDE: 600; 310; 30; 20 INJECTION, SOLUTION INTRAVENOUS at 11:16

## 2018-06-05 RX ADMIN — DEXAMETHASONE SODIUM PHOSPHATE 4 MG: 4 INJECTION, SOLUTION INTRA-ARTICULAR; INTRALESIONAL; INTRAMUSCULAR; INTRAVENOUS; SOFT TISSUE at 10:09

## 2018-06-05 RX ADMIN — KETAMINE HCL-NACL SOLN PREF SY 50 MG/5ML-0.9% (10MG/ML) 15 MG: 10 SOLUTION PREFILLED SYRINGE at 10:05

## 2018-06-05 RX ADMIN — KETAMINE HCL-NACL SOLN PREF SY 50 MG/5ML-0.9% (10MG/ML) 15 MG: 10 SOLUTION PREFILLED SYRINGE at 11:23

## 2018-06-05 RX ADMIN — HEPARIN SODIUM 1000 ML: 1000 INJECTION, SOLUTION INTRAVENOUS; SUBCUTANEOUS at 11:08

## 2018-06-05 NOTE — IP AVS SNAPSHOT
MRN:[id number]                      After Visit Summary   6/5/2018    Buffy Mayer    MRN: [id number]           Thank you!     Thank you for choosing [place] for your care. Our goal is always to provide you with excellent care. Hearing back from our patients is one way we can continue to improve our services. Please take a few minutes to complete the written survey that you may receive in the mail after you visit with us. Thank you!        Patient Information     Date Of Birth          1985        About your hospital stay     You were admitted on:  June 5, 2018 [facility]    You were discharged on:  June 5, 2018       Who to Call     For medical emergencies, please call 911.  For non-urgent questions about your medical care, please call your primary care provider or clinic, [phone]  For questions related to your surgery, please call your surgery clinic        Attending Provider     Provider Fran Estrada MD OB/Gyn       Primary Care Provider Office Phone # Fax #    [facility] [phone]      After Care Instructions     Discharge Instructions       Patient to follow up with appointment in 2 weeks                  Further instructions from your care team       Same Day Surgery Discharge Instructions for  Sedation and General Anesthesia       It's not unusual to feel dizzy, light-headed or faint for up to 24 hours after surgery or while taking pain medication.  If you have these symptoms: sit for a few minutes before standing and have someone assist you when you get up to walk or use the bathroom.      You should rest and relax for the next 24 hours. We recommend you make arrangements to have an adult stay with you for at least 24 hours after your discharge.  Avoid hazardous and strenuous activity.      DO NOT DRIVE any vehicle or operate mechanical equipment for 24 hours following the  end of your surgery.  Even though you may feel normal, your reactions may be affected by the medication you have received.      Do not drink alcoholic beverages for 24 hours following surgery.       Slowly progress to your regular diet as you feel able. It's not unusual to feel nauseated and/or vomit after receiving anesthesia.  If you develop these symptoms, drink clear liquids (apple juice, ginger ale, broth, 7-up, etc. ) until you feel better.  If your nausea and vomiting persists for 24 hours, please notify your surgeon.        All narcotic pain medications, along with inactivity and anesthesia, can cause constipation. Drinking plenty of liquids and increasing fiber intake will help.      For any questions of a medical nature, call your surgeon.      Do not make important decisions for 24 hours.      If you had general anesthesia, you may have a sore throat for a couple of days related to the breathing tube used during surgery.  You may use Cepacol lozenges to help with this discomfort.  If it worsens or if you develop a fever, contact your surgeon.       If you feel your pain is not well managed with the pain medications prescribed by your surgeon, please contact your surgeon's office to let them know so they can address your concerns.       HOME CARE FOLLOWING LAPAROSCOPY  [facility]  [phone]      Diet  You have no restrictions on your diet.  During the evening following surgery, drink plenty of fluids and eat a light supper.    Nausea  The anesthesia may produce some nausea.  If you feel nauseated try drinking fluids such as 7-Up, tea, or soup.     Discomfort  The amount of discomfort you can expect is very unpredictable.  If you have pain that cannot be controlled with Tylenol or with the prescription you may have received, you should notify your physician.  The following complaints are not uncommon and should not be cause for concern:  1. Abdominal tenderness; abdominal cramping.  2. Low  "backache or pain radiating to your shoulders, chest or back.  This is a result of the gas used to inflate your abdomen during surgery.  Lying flat in bed seems to help relieve this.   3. Sore throat for a day or two resulting from the anesthesia tube used during surgery.   4. Some bruising on your abdomen.     Drainage  You may expect a small amount of drainage from the incision on your abdomen and you may change the bandage when necessary.  You will also have a small amount of vaginal drainage for several days; this is normal and no cause for concern.  If excessive bleeding occurs, notify your physician.      If dye was used during your procedure, your urine will initially be bright blue. It will gradually return to yellow throughout the day. Drinking plenty of fluids will help to filter the dye from your urine.    Fever  A low grade fever (not over 101  Fahrenheit) is usual after this procedure.  Do not hesitate to notify your physician if your fever seems excessive.    Activity  Rest on the day of surgery then you may resume your normal activity, as tolerated. Avoid heavy lifting for one week.    You may shower.  Do not douche or use tampons.  If you also had a D&C, do not resume intercourse until bleeding has ceased.    Emergency Care  Contact your physician if you have any of these problems:   1.  A fever over 101  Fahrenheit   2.  A large amount of bleeding or drainage   3.  Severe pain        Pending Results     Date and Time Order Name Status Description    6/5/2018 1122 Surgical pathology exam In process             Admission Information     Date & Time Provider Department Dept. Phone    6/5/2018 Fran Ayala MD [facility] [phone]      Your Vitals Were     Blood Pressure Temperature Respirations Height Weight Last Period    115/65 98.4  F (36.9  C) 18 1.651 m (5' 5\") 72.1 kg (159 lb) 05/31/2018    Pulse Oximetry BMI (Body Mass Index)                97% 26.46 kg/m2          MyChart " "Information     [employer] lets you send messages to your doctor, view your test results, renew your prescriptions, schedule appointments and more. To sign up, go to [email or web address] . Click on \"Log in\" on the left side of the screen, which will take you to the Welcome page. Then click on \"Sign up Now\" on the right side of the page.     You will be asked to enter the access code listed below, as well as some personal information. Please follow the directions to create your username and password.     Your access code is: [email or web address]  Expires: 2018  1:16 PM     Your access code will  in 90 days. If you need help or a new code, please call your [place] clinic or [phone].        Care EveryWhere ID     This is your Care EveryWhere ID. This could be used by other organizations to access your [place] medical records  [id number]        Equal Access to Services     ANDRESSA ESPINO : Marcell Manzano, wajwda víctor, qaybta kaalmayoli rosales, mateo eisenberg . So [facility] [phone].    ATENCIÓN: Si fouzia almanza, tiene a sotelo disposición servicios gratuitos de asistencia lingüística. Llame al [phone].    We comply with applicable federal civil rights laws and [place] laws. We do not discriminate on the basis of race, color, national origin, age, disability, sex, sexual orientation, or gender identity.               Review of your medicines      START taking        Dose / Directions    oxyCODONE-acetaminophen 5-325 MG per tablet   Commonly known as:  PERCOCET        Dose:  1-2 tablet   Take 1-2 tablets by mouth every 4 hours as needed for pain (moderate to severe)   Quantity:  30 tablet   Refills:  0         CONTINUE these medicines which have NOT CHANGED        Dose / Directions    IBUPROFEN PO        Dose:  400 mg   Take 400 mg by mouth every 6 hours   Refills:  0       KARIVA 0.15-0.02/0.01 MG (/) per tablet   Used for:  Uses oral contraception   Generic " drug:  desogestrel-ethinyl estradiol        Dose:  1 tablet   Take 1 tablet by mouth daily   Quantity:  84 tablet   Refills:  3            Where to get your medicines      Some of these will need a paper prescription and others can be bought over the counter. Ask your nurse if you have questions.     Bring a paper prescription for each of these medications     oxyCODONE-acetaminophen 5-325 MG per tablet                Protect others around you: Learn how to safely use, store and throw away your medicines at www.disposemymeds.org.        Information about OPIOIDS     PRESCRIPTION OPIOIDS: WHAT YOU NEED TO KNOW   You have a prescription for an opioid (narcotic) pain medicine. Opioids can cause addiction. If you have a history of chemical dependency of any type, you are at a higher risk of becoming addicted to opioids. Only take this medicine after all other options have been tried. Take it for as short a time and as few doses as possible.     Do not:    Drive. If you drive while taking these medicines, you could be arrested for driving under the influence (DUI).    Operate heavy machinery    Do any other dangerous activities while taking these medicines.     Drink any alcohol while taking these medicines.      Take with any other medicines that contain acetaminophen. Read all labels carefully. Look for the word  acetaminophen  or  Tylenol.  Ask your pharmacist if you have questions or are unsure.    Store your pills in a secure place, locked if possible. We will not replace any lost or stolen medicine. If you don t finish your medicine, please throw away (dispose) as directed by your pharmacist. The [place] [employer]ution [employer] has more information about safe disposal: https://www.pca.state.[place]/living-green/managing-unwanted-medications    All opioids tend to cause constipation. Drink plenty of water and eat foods that have a lot of fiber, such as fruits, vegetables, prune juice, apple juice and high-fiber  cereal. Take a laxative (Miralax, milk of magnesia, Colace, Senna) if you don t move your bowels at least every other day.              Medication List: This is a list of all your medications and when to take them. Check marks below indicate your daily home schedule. Keep this list as a reference.      Medications           Morning Afternoon Evening Bedtime As Needed    IBUPROFEN PO   Take 400 mg by mouth every 6 hours                                KARIVA 0.15-0.02/0.01 MG (21/5) per tablet   Take 1 tablet by mouth daily   Generic drug:  desogestrel-ethinyl estradiol                                oxyCODONE-acetaminophen 5-325 MG per tablet   Commonly known as:  PERCOCET   Take 1-2 tablets by mouth every 4 hours as needed for pain (moderate to severe)

## 2018-06-05 NOTE — IP AVS SNAPSHOT
[facility]    [address] [place] [id number]    Phone:  [phone]                                       After Visit Summary   6/5/2018    Buffy Mayer    MRN: [id number]           After Visit Summary Signature Page     I have received my discharge instructions, and my questions have been answered. I have discussed any challenges I see with this plan with the nurse or doctor.    ..........................................................................................................................................  Patient/Patient Representative Signature      ..........................................................................................................................................  Patient Representative Print Name and Relationship to Patient    ..................................................               ................................................  Date                                            Time    ..........................................................................................................................................  Reviewed by Signature/Title    ...................................................              ..............................................  Date                                                            Time

## 2018-06-05 NOTE — DISCHARGE INSTRUCTIONS
Same Day Surgery Discharge Instructions for  Sedation and General Anesthesia       It's not unusual to feel dizzy, light-headed or faint for up to 24 hours after surgery or while taking pain medication.  If you have these symptoms: sit for a few minutes before standing and have someone assist you when you get up to walk or use the bathroom.      You should rest and relax for the next 24 hours. We recommend you make arrangements to have an adult stay with you for at least 24 hours after your discharge.  Avoid hazardous and strenuous activity.      DO NOT DRIVE any vehicle or operate mechanical equipment for 24 hours following the end of your surgery.  Even though you may feel normal, your reactions may be affected by the medication you have received.      Do not drink alcoholic beverages for 24 hours following surgery.       Slowly progress to your regular diet as you feel able. It's not unusual to feel nauseated and/or vomit after receiving anesthesia.  If you develop these symptoms, drink clear liquids (apple juice, ginger ale, broth, 7-up, etc. ) until you feel better.  If your nausea and vomiting persists for 24 hours, please notify your surgeon.        All narcotic pain medications, along with inactivity and anesthesia, can cause constipation. Drinking plenty of liquids and increasing fiber intake will help.      For any questions of a medical nature, call your surgeon.      Do not make important decisions for 24 hours.      If you had general anesthesia, you may have a sore throat for a couple of days related to the breathing tube used during surgery.  You may use Cepacol lozenges to help with this discomfort.  If it worsens or if you develop a fever, contact your surgeon.       If you feel your pain is not well managed with the pain medications prescribed by your surgeon, please contact your surgeon's office to let them know so they can address your concerns.       HOME CARE FOLLOWING LAPAROSCOPY  [facility]  [phone]      Diet  You have no restrictions on your diet.  During the evening following surgery, drink plenty of fluids and eat a light supper.    Nausea  The anesthesia may produce some nausea.  If you feel nauseated try drinking fluids such as 7-Up, tea, or soup.     Discomfort  The amount of discomfort you can expect is very unpredictable.  If you have pain that cannot be controlled with Tylenol or with the prescription you may have received, you should notify your physician.  The following complaints are not uncommon and should not be cause for concern:  1. Abdominal tenderness; abdominal cramping.  2. Low backache or pain radiating to your shoulders, chest or back.  This is a result of the gas used to inflate your abdomen during surgery.  Lying flat in bed seems to help relieve this.   3. Sore throat for a day or two resulting from the anesthesia tube used during surgery.   4. Some bruising on your abdomen.     Drainage  You may expect a small amount of drainage from the incision on your abdomen and you may change the bandage when necessary.  You will also have a small amount of vaginal drainage for several days; this is normal and no cause for concern.  If excessive bleeding occurs, notify your physician.      If dye was used during your procedure, your urine will initially be bright blue. It will gradually return to yellow throughout the day. Drinking plenty of fluids will help to filter the dye from your urine.    Fever  A low grade fever (not over 101  Fahrenheit) is usual after this procedure.  Do not hesitate to notify your physician if your fever seems excessive.    Activity  Rest on the day of surgery then you may resume your normal activity, as tolerated. Avoid heavy lifting for one week.    You may shower.  Do not douche or use tampons.  If you also had a D&C, do not resume intercourse until bleeding has ceased.    Emergency Care  Contact your physician if you have any of these  problems:   1.  A fever over 101  Fahrenheit   2.  A large amount of bleeding or drainage   3.  Severe pain

## 2018-06-05 NOTE — ANESTHESIA PREPROCEDURE EVALUATION
Anesthesia Evaluation     . Pt has had prior anesthetic.     No history of anesthetic complications          ROS/MED HX    ENT/Pulmonary:       Neurologic:       Cardiovascular:         METS/Exercise Tolerance:     Hematologic:         Musculoskeletal:         GI/Hepatic:         Renal/Genitourinary:         Endo:         Psychiatric:         Infectious Disease:         Malignancy:         Other:                     Physical Exam  Normal systems: cardiovascular and pulmonary    Airway   Mallampati: I  TM distance: >3 FB  Neck ROM: full    Dental     Cardiovascular       Pulmonary                     Anesthesia Plan      History & Physical Review  History and physical reviewed and following examination; no interval change.    ASA Status:  1 .    NPO Status:  > 8 hours    Plan for General and ETT with Intravenous and Propofol induction. Maintenance will be TIVA.    PONV prophylaxis:  Ondansetron (or other 5HT-3) and Dexamethasone or Solumedrol  Tylenol, Tordal, Ketamine (0.15mg/kg pre-incision and q1H until emergence)      Postoperative Care  Postoperative pain management:  IV analgesics and Oral pain medications.      Consents  Anesthetic plan, risks, benefits and alternatives discussed with:  Patient..        DPreop diagnosis: MYOMAS   Procedure(s):  LAPAROSCOPY DIAGNOSTIC (GYN)  MYOMECTOMY UTERUS  No Known Allergies    No current facility-administered medications on file prior to encounter.   Current Outpatient Prescriptions on File Prior to Encounter:  KARIVA 0.15-0.02/0.01 MG (21/5) per tablet Take 1 tablet by mouth daily     No results found for: HGB, INR, POTASSIUM                    .

## 2018-06-05 NOTE — ANESTHESIA POSTPROCEDURE EVALUATION
Patient: Buffy Mayer    Procedure(s):  LAPAROSCOPY WITH MYOMECTOMY x 7 - Wound Class: II-Clean Contaminated    Diagnosis:MYOMAS   Diagnosis Additional Information: No value filed.    Anesthesia Type:  General, ETT    Note:  Anesthesia Post Evaluation    Patient location during evaluation: PACU  Patient participation: Able to fully participate in evaluation  Level of consciousness: awake  Pain management: adequate  Airway patency: patent  Cardiovascular status: acceptable  Respiratory status: acceptable  Hydration status: acceptable  PONV: none     Anesthetic complications: None          Last vitals:  Vitals:    06/05/18 0734 06/05/18 1151 06/05/18 1200   BP: 128/66 101/61 103/57   Resp: 16 16 12   Temp: 36.2  C (97.2  F) 35.7  C (96.3  F)    SpO2: 99%  100%         Electronically Signed By: Francisco Lockett MD  June 5, 2018  12:17 PM

## 2018-06-05 NOTE — ANESTHESIA CARE TRANSFER NOTE
Patient: Buffy Mayer    Procedure(s):  LAPAROSCOPY WITH MYOMECTOMY x 7 - Wound Class: II-Clean Contaminated    Diagnosis: MYOMAS   Diagnosis Additional Information: No value filed.    Anesthesia Type:   General, ETT     Note:  Airway :Face Mask  Patient transferred to:PACU  Handoff Report: Identifed the Patient, Identified the Reponsible Provider, Reviewed the pertinent medical history, Discussed the surgical course, Reviewed Intra-OP anesthesia mangement and issues during anesthesia, Set expectations for post-procedure period and Allowed opportunity for questions and acknowledgement of understanding      Vitals: (Last set prior to Anesthesia Care Transfer)    CRNA VITALS  6/5/2018 1118 - 6/5/2018 1153      6/5/2018             Pulse: 69    SpO2: 94 %    Resp Rate (observed): 12                Electronically Signed By: REJI Mccall CRNA  June 5, 2018  11:53 AM

## 2018-06-05 NOTE — OP NOTE
Procedure Date: 06/05/2018      PREOPERATIVE DIAGNOSIS:  Multiple uterine fibroids.      POSTOPERATIVE DIAGNOSIS:  Multiple uterine fibroids.      PROCEDURE:  Laparoscopic myomectomy x7.      OPERATIVE FINDINGS:  The patient had 7 sub serosal fibroids that were all removed without interruption of myometrium.  All were submitted to the pathologist.  The tubes and ovaries looked completely normal.  Ureters were peristalsing normally throughout the case.      OPERATIVE PROCEDURE:  After general anesthesia was induced, the patient was placed in the dorsal lithotomy position and prepped and draped in the usual fashion.  A Ruiz catheter was placed.  A uterine manipulator was placed.      Through a subumbilical incision, the Veress needle was placed, and 3 liters of CO2 were insufflated.  The laparoscope, trocar and sheath were placed without incident.  Two 5 mm lower quadrant trocars were placed through Marcaine-injected fields.  The uterus was surveyed, and we felt that we could approach these laparoscopically.  The largest fibroid was actually in the right broad ligament.  Pitressin was injected at the base of all the fibroids.  The peritoneum overlying the broad ligament on the right was opened.  The fibroid was grasped, elevated and dissected with bipolar cutting forceps.  This was placed in the cul-de-sac for morcellation later.  Each of the additional fibroids were approached with a relaxing incision and dissection of the fibroid out with both unipolar needle and bipolar cutting forceps.  All of the fibroids were dissected free without interruption of myometrium.      The left lower quadrant trocar site was expanded to admit the morcellator.  All 7 fibroids were removed with morcellation and submitted to the pathologist.  Inspection of all sites showed hemostasis.  Copious irrigation was undertaken.  One sheet of Interceed was placed to cover the broad ligament and fundal myomectomy sites.      The 11 mm expansion  of the left lower quadrant trocar site was closed with the Emerson-Thomas closure device at the perineum and at the fascia with 2-0 Vicryl.  All sites were inspected under low pressure and under water and were hemostatic.  All gas was exhausted, and instruments were removed.  The skin incisions were closed with 4-0 Vicryl interrupted sutures and Steri-Strips.  The estimated blood loss was 25 mL.  The patient tolerated this well.  Due to the extensive nature of dissection and length of the case, she will be observed in the observation unit for 6 hours.  If she is stable, she will be able to head home this evening.         MANDO MONTILLA JR, MD             D: 2018   T: 2018   MT: DEVIN      Name:     YESY DUPREE   MRN:      [id number]        Account:        [id number]   :      1985           Procedure Date: 2018      Document: [id number]

## 2018-06-06 LAB — COPATH REPORT: NORMAL

## 2018-06-06 NOTE — PLAN OF CARE
Problem: Patient Care Overview  Goal: Plan of Care/Patient Progress Review  Outcome: Adequate for Discharge Date Met: 06/05/18  VSS.  Pain managed by IV dilaudid. Pt denies passing flatus/nausea/BM.  Bee had 700ccs out, removed bee.  Pt up to void in toilet.   Pt up with SBA.  Lap sites are c/d/i.  IV removed.  IS teaching completed.  Post op education completed including information on diet, activity, s/s of infection and follow up.  Meds reviewed and given to patient.  Pt discharged at 1950 via wheelchair with spouse.

## 2018-06-14 ENCOUNTER — OFFICE VISIT (OUTPATIENT)
Dept: OBGYN | Facility: CLINIC | Age: 33
End: 2018-06-14
Payer: COMMERCIAL

## 2018-06-14 VITALS
WEIGHT: 159 LBS | BODY MASS INDEX: 26.49 KG/M2 | DIASTOLIC BLOOD PRESSURE: 60 MMHG | HEART RATE: 68 BPM | SYSTOLIC BLOOD PRESSURE: 108 MMHG | HEIGHT: 65 IN

## 2018-06-14 DIAGNOSIS — Z09 POSTOP CHECK: Primary | ICD-10-CM

## 2018-06-14 DIAGNOSIS — Z48.816 AFTERCARE FOLLOWING SURGERY OF THE GENITOURINARY SYSTEM: Primary | ICD-10-CM

## 2018-06-14 LAB — HGB BLD-MCNC: 13.9 G/DL (ref 11.7–15.7)

## 2018-06-14 PROCEDURE — 36415 COLL VENOUS BLD VENIPUNCTURE: CPT | Performed by: OBSTETRICS & GYNECOLOGY

## 2018-06-14 PROCEDURE — 99024 POSTOP FOLLOW-UP VISIT: CPT | Performed by: OBSTETRICS & GYNECOLOGY

## 2018-06-14 PROCEDURE — 85018 HEMOGLOBIN: CPT | Performed by: OBSTETRICS & GYNECOLOGY

## 2018-06-14 NOTE — MR AVS SNAPSHOT
"              After Visit Summary   6/14/2018    Buffy Mayer    MRN: [id number]           Patient Information     Date Of Birth          1985        Visit Information        Provider Department      6/14/2018 4:30 PM Fran Ayala MD [facility]        Today's Diagnoses     Postop check    -  1       Follow-ups after your visit        Who to contact     If you have questions or need follow up information about today's clinic visit or your schedule please contact [facility] [place] directly at [phone].  Normal or non-critical lab and imaging results will be communicated to you by Clarisonichart, letter or phone within 4 business days after the clinic has received the results. If you do not hear from us within 7 days, please contact the clinic through ChinaCachet or phone. If you have a critical or abnormal lab result, we will notify you by phone as soon as possible.  Submit refill requests through Graymatics or call your pharmacy and they will forward the refill request to us. Please allow 3 business days for your refill to be completed.          Additional Information About Your Visit        MyChart Information     Graymatics gives you secure access to your electronic health record. If you see a primary care provider, you can also send messages to your care team and make appointments. If you have questions, please call your primary care clinic.  If you do not have a primary care provider, please call [phone] and they will assist you.        Care EveryWhere ID     This is your Care EveryWhere ID. This could be used by other organizations to access your [place] medical records  [id number]        Your Vitals Were     Pulse Height Last Period BMI (Body Mass Index)          68 5' 5\" (1.651 m) 05/31/2018 26.46 kg/m2         Blood Pressure from Last 3 Encounters:   06/14/18 108/60   06/05/18 115/65   05/21/18 106/60    Weight from Last 3 Encounters:   06/14/18 159 lb (72.1 kg) "   06/05/18 159 lb (72.1 kg)   05/21/18 163 lb (73.9 kg)              Today, you had the following     No orders found for display       Primary Care Provider Office Phone # Fax #    [facility] [phone]       [facility] [address]  [place] [address]        Equal Access to Services     ANRDESSA ESPINO : Hadii aad ku hadasho Soomaali, waaxda luqadaha, qaybta kaalmada adeegyada, waxay idiin hayrocion adewanda stovallsanjivsimba eisenberg . So [facility] [phone].    ATENCIÓN: Si fouzia almanza, tiene a sotelo disposición servicios gratuitos de asistencia lingüística. Zoieame al [phone].    We comply with applicable federal civil rights laws and [place] laws. We do not discriminate on the basis of race, color, national origin, age, disability, sex, sexual orientation, or gender identity.            Thank you!     Thank you for choosing [facility] [place]  for your care. Our goal is always to provide you with excellent care. Hearing back from our patients is one way we can continue to improve our services. Please take a few minutes to complete the written survey that you may receive in the mail after your visit with us. Thank you!             Your Updated Medication List - Protect others around you: Learn how to safely use, store and throw away your medicines at www.disposemymeds.org.          This list is accurate as of 6/14/18  4:38 PM.  Always use your most recent med list.                   Brand Name Dispense Instructions for use Diagnosis    IBUPROFEN PO      Take 400 mg by mouth every 6 hours        KARIVA 0.15-0.02/0.01 MG (21/5) per tablet   Generic drug:  desogestrel-ethinyl estradiol     84 tablet    Take 1 tablet by mouth daily    Uses oral contraception       oxyCODONE-acetaminophen 5-325 MG per tablet    PERCOCET    30 tablet    Take 1-2 tablets by mouth every 4 hours as needed for pain (moderate to severe)    Myoma

## 2018-06-14 NOTE — PROGRESS NOTES
The patient is seen at this time for postoperative check after laparoscopic myomectomy ×7.  She was doing very well but overdid her activity yesterday and is now sore.  Her hemoglobin is 13.9 g percent today.  She is to back off activity for at least 7-10 days to allow her to heal.  We will see her back in 2 months.  Her pathology was benign.

## 2018-07-10 ENCOUNTER — TELEPHONE (OUTPATIENT)
Dept: OBGYN | Facility: CLINIC | Age: 33
End: 2018-07-10

## 2018-07-11 ENCOUNTER — TELEPHONE (OUTPATIENT)
Dept: OBGYN | Facility: CLINIC | Age: 33
End: 2018-07-11

## 2018-07-11 NOTE — TELEPHONE ENCOUNTER
Pt calling and requesting Immunization record be faxed to a different number. Fax: [phone]  Report faxed as requested. No further questions.

## 2018-08-03 DIAGNOSIS — Z30.41 USES ORAL CONTRACEPTION: ICD-10-CM

## 2018-08-03 NOTE — TELEPHONE ENCOUNTER
"Requested Prescriptions   Pending Prescriptions Disp Refills     KARIVA 0.15-0.02/0.01 MG (21/5) per tablet 84 tablet 3     Sig: Take 1 tablet by mouth daily    Contraceptives Protocol Passed    8/3/2018  3:41 PM       Passed - Patient is not a current smoker if age is 35 or older       Passed - Recent (12 mo) or future (30 days) visit within the authorizing provider's specialty    Patient had office visit in the last 12 months or has a visit in the next 30 days with authorizing provider or within the authorizing provider's specialty.  See \"Patient Info\" tab in inbasket, or \"Choose Columns\" in Meds & Orders section of the refill encounter.           Passed - No active pregnancy on record       Passed - No positive pregnancy test in past 12 months        Last Written Prescription Date:  5/15/18  Last Fill Quantity: 84,  # refills: 0   Last office visit: 6/14/2018 with prescribing provider:  tio   Future Office Visit:      "

## 2018-08-06 RX ORDER — DESOG-E.ESTRADIOL/E.ESTRADIOL 21-5 (28)
1 TABLET ORAL DAILY
Qty: 84 TABLET | Refills: 1 | Status: SHIPPED | OUTPATIENT
Start: 2018-08-06 | End: 2019-01-22

## 2018-08-29 ENCOUNTER — OFFICE VISIT (OUTPATIENT)
Dept: OBGYN | Facility: CLINIC | Age: 33
End: 2018-08-29
Payer: COMMERCIAL

## 2018-08-29 VITALS
DIASTOLIC BLOOD PRESSURE: 76 MMHG | HEART RATE: 80 BPM | BODY MASS INDEX: 26.82 KG/M2 | HEIGHT: 65 IN | WEIGHT: 161 LBS | SYSTOLIC BLOOD PRESSURE: 110 MMHG

## 2018-08-29 DIAGNOSIS — D21.9 MYOMA: Primary | ICD-10-CM

## 2018-08-29 DIAGNOSIS — Z11.1 TUBERCULOSIS SCREENING: ICD-10-CM

## 2018-08-29 PROCEDURE — 99024 POSTOP FOLLOW-UP VISIT: CPT | Performed by: OBSTETRICS & GYNECOLOGY

## 2018-08-29 PROCEDURE — 99212 OFFICE O/P EST SF 10 MIN: CPT | Mod: 24 | Performed by: OBSTETRICS & GYNECOLOGY

## 2018-08-29 NOTE — LETTER
8/31/2018     Buffy Mayer  [address] [place] [address]        Buffy Mayer your lab results came back normal. Your TB test was negative.      Results for orders placed or performed in visit on 08/29/18   Quantiferon TB Gold Plus   Result Value Ref Range    Quantiferon-TB Gold Plus Result Negative NEG^Negative    TB1 Ag minus Nil Value 0.00 IU/mL    TB2 Ag minus Nil Value 0.00 IU/mL    Mitogen minus Nil Result 8.73 IU/mL    Nil Result 0.03 IU/mL         Doris Bacon, REJI CNP- on behalf of Dr. Ayala

## 2018-08-29 NOTE — MR AVS SNAPSHOT
"              After Visit Summary   8/29/2018    Buffy Mayer    MRN: [id number]           Patient Information     Date Of Birth          1985        Visit Information        Provider Department      8/29/2018 11:45 AM Fran Ayala MD [facility]        Today's Diagnoses     Myoma    -  1    Tuberculosis screening           Follow-ups after your visit        Who to contact     If you have questions or need follow up information about today's clinic visit or your schedule please contact [facility] directly at [phone].  Normal or non-critical lab and imaging results will be communicated to you by LYZER DIAGNOSTICShart, letter or phone within 4 business days after the clinic has received the results. If you do not hear from us within 7 days, please contact the clinic through Carepeuticst or phone. If you have a critical or abnormal lab result, we will notify you by phone as soon as possible.  Submit refill requests through Sanako or call your pharmacy and they will forward the refill request to us. Please allow 3 business days for your refill to be completed.          Additional Information About Your Visit        MyChart Information     Sanako gives you secure access to your electronic health record. If you see a primary care provider, you can also send messages to your care team and make appointments. If you have questions, please call your primary care clinic.  If you do not have a primary care provider, please call [phone] and they will assist you.        Care EveryWhere ID     This is your Care EveryWhere ID. This could be used by other organizations to access your [place] medical records  [id number]        Your Vitals Were     Pulse Height Last Period BMI (Body Mass Index)          80 5' 5\" (1.651 m) 08/21/2018 26.79 kg/m2         Blood Pressure from Last 3 Encounters:   08/29/18 110/76   06/14/18 108/60   06/05/18 115/65    Weight from Last 3 Encounters:   08/29/18 " 161 lb (73 kg)   06/14/18 159 lb (72.1 kg)   06/05/18 159 lb (72.1 kg)              We Performed the Following     M Tuberculosis by Quantiferon        Primary Care Provider Office Phone # Fax #    [facility] [phone]       [facility] [address] [facility] [address]  [facility] [address]        Equal Access to Services     ANDRESSA ESPINO : Hadii aad ku hadasho Soomaali, waaxda luqadaha, qaybta kaalmada adeegyada, waxay idiin hayaan adewanda khsanjivsimba eisenberg . So [facility] [phone].    ATENCIÓN: Si habla espnalini, tiene a sotelo disposición servicios gratuitos de asistencia lingüística. Llame al [phone].    We comply with applicable federal civil rights laws and [place] laws. We do not discriminate on the basis of race, color, national origin, age, disability, sex, sexual orientation, or gender identity.            Thank you!     Thank you for choosing [facility]  for your care. Our goal is always to provide you with excellent care. Hearing back from our patients is one way we can continue to improve our services. Please take a few minutes to complete the written survey that you may receive in the mail after your visit with us. Thank you!             Your Updated Medication List - Protect others around you: Learn how to safely use, store and throw away your medicines at www.disposemymeds.org.          This list is accurate as of 8/29/18 12:11 PM.  Always use your most recent med list.                   Brand Name Dispense Instructions for use Diagnosis    KARIVA 0.15-0.02/0.01 MG (21/5) per tablet   Generic drug:  desogestrel-ethinyl estradiol     84 tablet    Take 1 tablet by mouth daily    Uses oral contraception

## 2018-08-29 NOTE — PROGRESS NOTES
SUBJECTIVE:                                                   Buffy Mayer is a 33 year old female who presents to clinic today for the following health issue(s):  Patient presents with:  Surgical Followup: 18 Laparoscopic myomectomy x7        HPI: The patient is seen at this time in follow-up of multiple myomectomies performed in  of this year.  Her pathology was benign.  She is asymptomatic at this time with no bladder or bowel pressure.  Her menses are mild to moderate but never severe at this time.      Patient's last menstrual period was 2018..   Patient is sexually active, .  Using oral contraceptives for contraception.    reports that she has never smoked. She has never used smokeless tobacco.    STD testing offered?  Declined    Health maintenance updated:  yes    Today's PHQ-2 Score:   PHQ-2 (  [employer]) 2018   Q1: Little interest or pleasure in doing things 0   Q2: Feeling down, depressed or hopeless 0   PHQ-2 Score 0     Today's PHQ-9 Score:   PHQ-9 SCORE 2017   Total Score 3     Today's BELINDA-7 Score:   BELINDA-7 SCORE 2017   Total Score 1       Problem list and histories reviewed & adjusted, as indicated.  Additional history: as documented.    Patient Active Problem List   Diagnosis     Myoma     Past Surgical History:   Procedure Laterality Date     COLONOSCOPY       HC TOOTH EXTRACTION W/FORCEP       LAPAROSCOPIC MYOMECTOMY UTERUS N/A 2018    Procedure: LAPAROSCOPIC MYOMECTOMY UTERUS;  LAPAROSCOPY WITH MYOMECTOMY x 7;  Surgeon: Fran Ayala MD;  Location: [facility]      Social History   Substance Use Topics     Smoking status: Never Smoker     Smokeless tobacco: Never Used     Alcohol use 0.0 oz/week     0 Standard drinks or equivalent per week      Comment: occ      Problem (# of Occurrences) Relation (Name,Age of Onset)    Breast Cancer (1) Maternal Grandmother    Hyperlipidemia (2) Mother, Father    Hypertension (2) Mother, Father            Current  "Outpatient Prescriptions   Medication Sig     KARIVA 0.15-0.02/0.01 MG (21/5) per tablet Take 1 tablet by mouth daily     No current facility-administered medications for this visit.      No Known Allergies    ROS:  12 point review of systems negative other than symptoms noted below.    OBJECTIVE:     /76  Pulse 80  Ht 5' 5\" (1.651 m)  Wt 161 lb (73 kg)  LMP 08/21/2018  BMI 26.79 kg/m2  Body mass index is 26.79 kg/(m^2).    Exam:  Constitutional:  Appearance: Well nourished, well developed alert, in no acute distress  Gastrointestinal:  Abdominal Examination:  Abdomen nontender to palpation, tone normal without rigidity or guarding, no masses present, umbilicus without lesions; Liver/Spleen:  No hepatomegaly present, liver nontender to palpation; Hernias:  No hernias present  Lymphatic: Lymph Nodes:  No other lymphadenopathy present  Skin:General Inspection:  No rashes present, no lesions present, no areas of discoloration; Genitalia and Groin:  No rashes present, no lesions present, no areas of discoloration, no masses present.  Neurologic/Psychiatric:  Mental Status:  Oriented X3   Pelvic Exam:  External Genitalia:     Normal appearance for age, no discharge present, no tenderness present, no inflammatory lesions present, color normal  Vagina:     Normal vaginal vault without central or paravaginal defects, no discharge present, no inflammatory lesions present, no masses present  Bladder:     Nontender to palpation  Urethra:   Urethral Body:  Urethra palpation normal, urethra structural support normal   Urethral Meatus:  No erythema or lesions present  Cervix:     Appearance healthy, no lesions present, nontender to palpation, no bleeding present  Uterus:     Uterus: firm, normal sized and nontender, midplane in position.   Adnexa:     No adnexal tenderness present, no adnexal masses present  Perineum:     Perineum within normal limits, no evidence of trauma, no rashes or skin lesions present  Anus:  "    Anus within normal limits, no hemorrhoids present  Inguinal Lymph Nodes:     No lymphadenopathy present  Pubic Hair:     Normal pubic hair distribution for age  Genitalia and Groin:     No rashes present, no lesions present, no areas of discoloration, no masses present       In-Clinic Test Results:      ASSESSMENT/PLAN:                                                      The patient is seen at this time for follow-up of multiple myomectomies in June.  Her cycles have been excellent.  Her incision lines have healed perfectly.  Examination shows a small mobile firm uterus that is nontender.  She requests a lab draw for TB for employment.  We have encouraged her to come back for her annual exam when due        Fran Ayala MD  [facility] [place]

## 2018-08-31 LAB
GAMMA INTERFERON BACKGROUND BLD IA-ACNC: 0.03 IU/ML
M TB IFN-G BLD-IMP: NEGATIVE
M TB IFN-G CD4+ BCKGRND COR BLD-ACNC: 8.73 IU/ML
MITOGEN IGNF BCKGRD COR BLD-ACNC: 0 IU/ML
MITOGEN IGNF BCKGRD COR BLD-ACNC: 0 IU/ML

## 2018-09-11 ENCOUNTER — HEALTH MAINTENANCE LETTER (OUTPATIENT)
Age: 33
End: 2018-09-11

## 2018-10-09 ENCOUNTER — HEALTH MAINTENANCE LETTER (OUTPATIENT)
Age: 33
End: 2018-10-09

## 2019-01-22 DIAGNOSIS — Z30.41 USES ORAL CONTRACEPTION: ICD-10-CM

## 2019-01-22 RX ORDER — DESOG-E.ESTRADIOL/E.ESTRADIOL 21-5 (28)
1 TABLET ORAL DAILY
Qty: 28 TABLET | Refills: 0 | Status: SHIPPED | OUTPATIENT
Start: 2019-01-22

## 2019-01-22 NOTE — TELEPHONE ENCOUNTER
"Requested Prescriptions   Pending Prescriptions Disp Refills     KARIVA 0.15-0.02/0.01 MG (21/5) tablet 84 tablet 1     Sig: Take 1 tablet by mouth daily    Contraceptives Protocol Passed - 1/22/2019  3:32 PM       Passed - Patient is not a current smoker if age is 35 or older       Passed - Recent (12 mo) or future (30 days) visit within the authorizing provider's specialty    Patient had office visit in the last 12 months or has a visit in the next 30 days with authorizing provider or within the authorizing provider's specialty.  See \"Patient Info\" tab in inbasket, or \"Choose Columns\" in Meds & Orders section of the refill encounter.             Passed - Medication is active on med list       Passed - No active pregnancy on record       Passed - No positive pregnancy test in past 12 months        Last Written Prescription Date:  8/6/18  Last Fill Quantity: 84,  # refills: 1   Last office visit: 8/29/2018 with prescribing provider:  Dr Fran Ayala   Future Office Visit:      Medication is being filled for 1 time refill only due to:  pt needs an appointment for an annual exam for further refills   Kaylah Kent RN on 1/22/2019 at 4:10 PM          "

## 2019-01-23 DIAGNOSIS — Z30.41 USES ORAL CONTRACEPTION: ICD-10-CM

## 2019-01-23 RX ORDER — DESOG-E.ESTRADIOL/E.ESTRADIOL 21-5 (28)
1 TABLET ORAL DAILY
Qty: 28 TABLET | Refills: 0 | OUTPATIENT
Start: 2019-01-23

## 2019-01-23 NOTE — TELEPHONE ENCOUNTER
"Requested Prescriptions   Pending Prescriptions Disp Refills     KARIVA 0.15-0.02/0.01 MG (21/5) tablet 28 tablet 0     Sig: Take 1 tablet by mouth daily    Contraceptives Protocol Passed - 1/23/2019  3:40 PM       Passed - Patient is not a current smoker if age is 35 or older       Passed - Recent (12 mo) or future (30 days) visit within the authorizing provider's specialty    Patient had office visit in the last 12 months or has a visit in the next 30 days with authorizing provider or within the authorizing provider's specialty.  See \"Patient Info\" tab in inbasket, or \"Choose Columns\" in Meds & Orders section of the refill encounter.             Passed - Medication is active on med list       Passed - No active pregnancy on record       Passed - No positive pregnancy test in past 12 months        Last Written Prescription Date:  1/22/19  Last Fill Quantity: 28,  # refills: 0   Last office visit: 8/29/2018 with prescribing provider:  Rodrick      Last annual exam: 8/8/2017 with multiple visits since including a myomectomy with Dr Ayala 6/5/18    Refused refill request, 1 month extension was sent 1/22/19, message sent to pharmacy  "

## (undated) DEVICE — SU VICRYL 2-0 TIE 12X18" J905T

## (undated) DEVICE — ENDO TROCAR  5-11MM VERSAPORT PLUS W/FIX CAN NB11STF

## (undated) DEVICE — NDL INSUFFLATION 120MM VERRES

## (undated) DEVICE — PREP SKIN SCRUB TRAY 4461A

## (undated) DEVICE — SYR 03ML LL W/O NDL 309657

## (undated) DEVICE — DRAPE IOBAN INCISE 23X17" 6650EZ

## (undated) DEVICE — NDL 22GA 1.5"

## (undated) DEVICE — SU VICRYL 2-0 CT-1 36" J345H

## (undated) DEVICE — DRSG STERI STRIP 1/2X4" R1547

## (undated) DEVICE — ESU FCP OLYMPUS PK CUTTING 5MMX33CM PK-CF0533

## (undated) DEVICE — SOL RINGERS LACTATED 1000ML BAG 2B2324X

## (undated) DEVICE — ESU HOLDER LAP INST DISP PURPLE LONG 330MM H-PRO-330

## (undated) DEVICE — SYSTEM CLEARIFY VISUALIZATION 21-345

## (undated) DEVICE — SU VICRYL 4-0 PS-2 18" UND J496H

## (undated) DEVICE — DECANTER BAG 2002S

## (undated) DEVICE — Device

## (undated) DEVICE — GLOVE PROTEXIS W/NEU-THERA 8.0  2D73TE80

## (undated) DEVICE — GLOVE PROTEXIS MICRO 7.5  2D73PM75

## (undated) DEVICE — SUCTION CANISTER MEDIVAC LINER 3000ML W/LID 65651-530

## (undated) DEVICE — SOL WATER IRRIG 1000ML BOTTLE 2F7114

## (undated) DEVICE — SU VICRYL 2-0 UR-6 27" J602H

## (undated) DEVICE — MORCELLATOR LAPAROSCOPIC LINA XCISE MOR-1515-6

## (undated) DEVICE — ESU ELEC NDL 1" COATED/INSULATED E1465

## (undated) DEVICE — CATH TRAY FOLEY SURESTEP 16FR DRAIN BAG STATOCK A899916

## (undated) DEVICE — SOL NACL 0.9% INJ 250ML BAG 2B1322Q

## (undated) DEVICE — TUBING HYDRO-SURG PLUS LAP IRRIGATOR SUCTION 0026870

## (undated) DEVICE — LINEN TOWEL PACK X5 5464

## (undated) DEVICE — BARRIER INTERCEED 3X4" 4350

## (undated) RX ORDER — KETOROLAC TROMETHAMINE 30 MG/ML
INJECTION, SOLUTION INTRAMUSCULAR; INTRAVENOUS
Status: DISPENSED
Start: 2018-06-05

## (undated) RX ORDER — HYDROMORPHONE HYDROCHLORIDE 1 MG/ML
INJECTION, SOLUTION INTRAMUSCULAR; INTRAVENOUS; SUBCUTANEOUS
Status: DISPENSED
Start: 2018-06-05

## (undated) RX ORDER — CEFAZOLIN SODIUM 2 G/100ML
INJECTION, SOLUTION INTRAVENOUS
Status: DISPENSED
Start: 2018-06-05

## (undated) RX ORDER — FENTANYL CITRATE 50 UG/ML
INJECTION, SOLUTION INTRAMUSCULAR; INTRAVENOUS
Status: DISPENSED
Start: 2018-06-05

## (undated) RX ORDER — KETAMINE HYDROCHLORIDE 10 MG/ML
INJECTION INTRAMUSCULAR; INTRAVENOUS
Status: DISPENSED
Start: 2018-06-05

## (undated) RX ORDER — NEOSTIGMINE METHYLSULFATE 1 MG/ML
VIAL (ML) INJECTION
Status: DISPENSED
Start: 2018-06-05

## (undated) RX ORDER — GLYCOPYRROLATE 0.2 MG/ML
INJECTION, SOLUTION INTRAMUSCULAR; INTRAVENOUS
Status: DISPENSED
Start: 2018-06-05

## (undated) RX ORDER — PROPOFOL 10 MG/ML
INJECTION, EMULSION INTRAVENOUS
Status: DISPENSED
Start: 2018-06-05

## (undated) RX ORDER — LIDOCAINE HYDROCHLORIDE 10 MG/ML
INJECTION, SOLUTION EPIDURAL; INFILTRATION; INTRACAUDAL; PERINEURAL
Status: DISPENSED
Start: 2018-06-05